# Patient Record
Sex: MALE | Race: WHITE | URBAN - NONMETROPOLITAN AREA
[De-identification: names, ages, dates, MRNs, and addresses within clinical notes are randomized per-mention and may not be internally consistent; named-entity substitution may affect disease eponyms.]

---

## 2017-09-20 ENCOUNTER — OPTICAL OFFICE (OUTPATIENT)
Dept: URBAN - NONMETROPOLITAN AREA CLINIC 5 | Facility: CLINIC | Age: 42
Setting detail: OPHTHALMOLOGY
End: 2017-09-20
Payer: COMMERCIAL

## 2017-09-20 ENCOUNTER — DOCTOR'S OFFICE (OUTPATIENT)
Dept: URBAN - NONMETROPOLITAN AREA CLINIC 2 | Facility: CLINIC | Age: 42
Setting detail: OPHTHALMOLOGY
End: 2017-09-20
Payer: COMMERCIAL

## 2017-09-20 DIAGNOSIS — H52.4: ICD-10-CM

## 2017-09-20 DIAGNOSIS — H52.13: ICD-10-CM

## 2017-09-20 DIAGNOSIS — H52.223: ICD-10-CM

## 2017-09-20 PROCEDURE — 92004 COMPRE OPH EXAM NEW PT 1/>: CPT | Performed by: OPTOMETRIST

## 2017-09-20 PROCEDURE — 92015 DETERMINE REFRACTIVE STATE: CPT | Performed by: OPTOMETRIST

## 2017-09-20 PROCEDURE — V2020 VISION SVCS FRAMES PURCHASES: HCPCS | Performed by: OPTOMETRIST

## 2017-09-20 PROCEDURE — V2202 LENS SPHERE BIFOCAL 7.12-20.: HCPCS | Performed by: OPTOMETRIST

## 2017-09-20 ASSESSMENT — REFRACTION_OUTSIDERX
OD_VA2: 20/20
OD_VA3: 20/
OD_CYLINDER: -0.75
OD_VA1: 20/20
OS_ADD: +1.25
OD_ADD: +1.25
OD_AXIS: 085
OS_SPHERE: -1.75
OU_VA: 20/20
OD_SPHERE: -2.00
OS_VA2: 20/20
OS_VA3: 20/
OS_VA1: 20/20

## 2017-09-20 ASSESSMENT — REFRACTION_MANIFEST
OS_VA2: 20/
OU_VA: 20/
OD_VA2: 20/
OD_VA3: 20/
OD_VA1: 20/
OS_VA2: 20/
OS_VA1: 20/
OS_VA1: 20/
OU_VA: 20/
OS_VA3: 20/
OD_VA1: 20/
OD_VA2: 20/
OS_VA3: 20/
OD_VA3: 20/

## 2017-09-20 ASSESSMENT — REFRACTION_CURRENTRX
OS_SPHERE: -1.75
OS_CYLINDER: -0.25
OS_OVR_VA: 20/
OD_CYLINDER: -0.50
OD_OVR_VA: 20/
OD_VPRISM_DIRECTION: SV
OD_SPHERE: -2.75
OD_OVR_VA: 20/
OD_OVR_VA: 20/
OS_OVR_VA: 20/
OD_AXIS: 88
OS_VPRISM_DIRECTION: SV
OS_OVR_VA: 20/
OS_AXIS: 108

## 2017-09-20 ASSESSMENT — CONFRONTATIONAL VISUAL FIELD TEST (CVF)
OD_FINDINGS: FULL
OS_FINDINGS: FULL

## 2017-09-20 ASSESSMENT — SPHEQUIV_DERIVED
OD_SPHEQUIV: -2.375
OS_SPHEQUIV: -1.75

## 2017-09-20 ASSESSMENT — REFRACTION_AUTOREFRACTION
OS_AXIS: 0
OS_SPHERE: -1.75
OD_SPHERE: -2.00
OD_AXIS: 82
OD_CYLINDER: -0.75
OS_CYLINDER: 0.00

## 2017-09-20 ASSESSMENT — VISUAL ACUITY
OS_BCVA: 20/15-2
OD_BCVA: 20/15-2

## 2017-09-26 ENCOUNTER — OPTICAL OFFICE (OUTPATIENT)
Dept: URBAN - NONMETROPOLITAN AREA CLINIC 5 | Facility: CLINIC | Age: 42
Setting detail: OPHTHALMOLOGY
End: 2017-09-26
Payer: COMMERCIAL

## 2017-09-26 DIAGNOSIS — H52.13: ICD-10-CM

## 2017-09-26 PROCEDURE — V2202 LENS SPHERE BIFOCAL 7.12-20.: HCPCS | Performed by: OPTOMETRIST

## 2017-09-26 PROCEDURE — V2020 VISION SVCS FRAMES PURCHASES: HCPCS | Performed by: OPTOMETRIST

## 2017-09-26 PROCEDURE — V2784 LENS POLYCARB OR EQUAL: HCPCS | Performed by: OPTOMETRIST

## 2025-06-18 ENCOUNTER — HOSPITAL ENCOUNTER (INPATIENT)
Facility: HOSPITAL | Age: 50
LOS: 2 days | Discharge: HOME | DRG: 052 | End: 2025-06-20
Attending: EMERGENCY MEDICINE | Admitting: HOSPITALIST
Payer: COMMERCIAL

## 2025-06-18 ENCOUNTER — APPOINTMENT (EMERGENCY)
Dept: CT IMAGING | Facility: HOSPITAL | Age: 50
DRG: 052 | End: 2025-06-18
Payer: COMMERCIAL

## 2025-06-18 ENCOUNTER — OCCMED (OUTPATIENT)
Dept: URGENT CARE | Facility: MEDICAL CENTER | Age: 50
End: 2025-06-18
Payer: OTHER MISCELLANEOUS
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
## 2025-06-18 DIAGNOSIS — I10 HYPERTENSION: ICD-10-CM

## 2025-06-18 DIAGNOSIS — I63.81 ACUTE LACUNAR INFARCTION (HCC): ICD-10-CM

## 2025-06-18 DIAGNOSIS — I63.9 CEREBRAL INFARCT (HCC): ICD-10-CM

## 2025-06-18 DIAGNOSIS — G45.4 TRANSIENT GLOBAL AMNESIA: ICD-10-CM

## 2025-06-18 DIAGNOSIS — Q25.43 ACTA2-RELATED FAMILIAL THORACIC AORTIC ANEURYSM: ICD-10-CM

## 2025-06-18 DIAGNOSIS — I63.9 CEREBRAL INFARCT (HCC): Primary | ICD-10-CM

## 2025-06-18 DIAGNOSIS — R79.1 SUBTHERAPEUTIC INTERNATIONAL NORMALIZED RATIO (INR): ICD-10-CM

## 2025-06-18 DIAGNOSIS — R29.90 STROKE-LIKE SYMPTOMS: ICD-10-CM

## 2025-06-18 DIAGNOSIS — Z86.73 H/O: CVA (CEREBROVASCULAR ACCIDENT): ICD-10-CM

## 2025-06-18 PROBLEM — Z95.2 S/P AVR (AORTIC VALVE REPLACEMENT): Status: ACTIVE | Noted: 2019-12-07

## 2025-06-18 LAB
ANION GAP SERPL CALCULATED.3IONS-SCNC: 6 MMOL/L (ref 4–13)
BASOPHILS # BLD AUTO: 0.04 THOUSANDS/ÂΜL (ref 0–0.1)
BASOPHILS NFR BLD AUTO: 1 % (ref 0–1)
BUN SERPL-MCNC: 27 MG/DL (ref 5–25)
CALCIUM SERPL-MCNC: 9.3 MG/DL (ref 8.4–10.2)
CHLORIDE SERPL-SCNC: 105 MMOL/L (ref 96–108)
CO2 SERPL-SCNC: 31 MMOL/L (ref 21–32)
CREAT SERPL-MCNC: 1.28 MG/DL (ref 0.6–1.3)
EOSINOPHIL # BLD AUTO: 0.19 THOUSAND/ÂΜL (ref 0–0.61)
EOSINOPHIL NFR BLD AUTO: 3 % (ref 0–6)
ERYTHROCYTE [DISTWIDTH] IN BLOOD BY AUTOMATED COUNT: 13.3 % (ref 11.6–15.1)
GFR SERPL CREATININE-BSD FRML MDRD: 64 ML/MIN/1.73SQ M
GLUCOSE SERPL-MCNC: 108 MG/DL (ref 65–140)
HCT VFR BLD AUTO: 43.8 % (ref 36.5–49.3)
HGB BLD-MCNC: 15.3 G/DL (ref 12–17)
IMM GRANULOCYTES # BLD AUTO: 0.01 THOUSAND/UL (ref 0–0.2)
IMM GRANULOCYTES NFR BLD AUTO: 0 % (ref 0–2)
INR PPP: 1.31 (ref 0.85–1.19)
LYMPHOCYTES # BLD AUTO: 2.57 THOUSANDS/ÂΜL (ref 0.6–4.47)
LYMPHOCYTES NFR BLD AUTO: 35 % (ref 14–44)
MAGNESIUM SERPL-MCNC: 2.4 MG/DL (ref 1.9–2.7)
MCH RBC QN AUTO: 31 PG (ref 26.8–34.3)
MCHC RBC AUTO-ENTMCNC: 34.9 G/DL (ref 31.4–37.4)
MCV RBC AUTO: 89 FL (ref 82–98)
MONOCYTES # BLD AUTO: 0.6 THOUSAND/ÂΜL (ref 0.17–1.22)
MONOCYTES NFR BLD AUTO: 8 % (ref 4–12)
NEUTROPHILS # BLD AUTO: 3.97 THOUSANDS/ÂΜL (ref 1.85–7.62)
NEUTS SEG NFR BLD AUTO: 53 % (ref 43–75)
NRBC BLD AUTO-RTO: 0 /100 WBCS
PLATELET # BLD AUTO: 245 THOUSANDS/UL (ref 149–390)
PMV BLD AUTO: 9.6 FL (ref 8.9–12.7)
POTASSIUM SERPL-SCNC: 4 MMOL/L (ref 3.5–5.3)
PROTHROMBIN TIME: 16.8 SECONDS (ref 12.3–15)
RBC # BLD AUTO: 4.94 MILLION/UL (ref 3.88–5.62)
SODIUM SERPL-SCNC: 142 MMOL/L (ref 135–147)
TSH SERPL DL<=0.05 MIU/L-ACNC: 3.28 UIU/ML (ref 0.45–4.5)
WBC # BLD AUTO: 7.38 THOUSAND/UL (ref 4.31–10.16)

## 2025-06-18 PROCEDURE — 36415 COLL VENOUS BLD VENIPUNCTURE: CPT | Performed by: EMERGENCY MEDICINE

## 2025-06-18 PROCEDURE — 99223 1ST HOSP IP/OBS HIGH 75: CPT | Performed by: FAMILY MEDICINE

## 2025-06-18 PROCEDURE — G0382 LEV 3 HOSP TYPE B ED VISIT: HCPCS | Performed by: PHYSICIAN ASSISTANT

## 2025-06-18 PROCEDURE — 99285 EMERGENCY DEPT VISIT HI MDM: CPT | Performed by: EMERGENCY MEDICINE

## 2025-06-18 PROCEDURE — 99285 EMERGENCY DEPT VISIT HI MDM: CPT

## 2025-06-18 PROCEDURE — 71275 CT ANGIOGRAPHY CHEST: CPT

## 2025-06-18 PROCEDURE — 74174 CTA ABD&PLVS W/CONTRAST: CPT

## 2025-06-18 PROCEDURE — 70498 CT ANGIOGRAPHY NECK: CPT

## 2025-06-18 PROCEDURE — 85610 PROTHROMBIN TIME: CPT | Performed by: EMERGENCY MEDICINE

## 2025-06-18 PROCEDURE — 70496 CT ANGIOGRAPHY HEAD: CPT

## 2025-06-18 PROCEDURE — 99283 EMERGENCY DEPT VISIT LOW MDM: CPT | Performed by: PHYSICIAN ASSISTANT

## 2025-06-18 PROCEDURE — 83735 ASSAY OF MAGNESIUM: CPT | Performed by: EMERGENCY MEDICINE

## 2025-06-18 PROCEDURE — 84443 ASSAY THYROID STIM HORMONE: CPT | Performed by: EMERGENCY MEDICINE

## 2025-06-18 PROCEDURE — 80048 BASIC METABOLIC PNL TOTAL CA: CPT | Performed by: EMERGENCY MEDICINE

## 2025-06-18 PROCEDURE — 85025 COMPLETE CBC W/AUTO DIFF WBC: CPT | Performed by: EMERGENCY MEDICINE

## 2025-06-18 RX ORDER — ACETAMINOPHEN 325 MG/1
650 TABLET ORAL EVERY 6 HOURS PRN
Status: DISCONTINUED | OUTPATIENT
Start: 2025-06-18 | End: 2025-06-20 | Stop reason: HOSPADM

## 2025-06-18 RX ORDER — LISINOPRIL 5 MG/1
2.5 TABLET ORAL EVERY MORNING
Status: DISCONTINUED | OUTPATIENT
Start: 2025-06-19 | End: 2025-06-20 | Stop reason: HOSPADM

## 2025-06-18 RX ORDER — ENOXAPARIN SODIUM 100 MG/ML
1 INJECTION SUBCUTANEOUS EVERY 12 HOURS SCHEDULED
Status: DISCONTINUED | OUTPATIENT
Start: 2025-06-18 | End: 2025-06-20 | Stop reason: HOSPADM

## 2025-06-18 RX ORDER — WARFARIN SODIUM 5 MG/1
5 TABLET ORAL
Status: DISCONTINUED | OUTPATIENT
Start: 2025-06-18 | End: 2025-06-20 | Stop reason: HOSPADM

## 2025-06-18 RX ORDER — ASPIRIN 81 MG/1
81 TABLET, CHEWABLE ORAL DAILY
Status: DISCONTINUED | OUTPATIENT
Start: 2025-06-19 | End: 2025-06-20 | Stop reason: HOSPADM

## 2025-06-18 RX ORDER — METOPROLOL SUCCINATE 100 MG/1
100 TABLET, EXTENDED RELEASE ORAL EVERY MORNING
Status: ON HOLD | COMMUNITY
End: 2025-06-19

## 2025-06-18 RX ORDER — ATORVASTATIN CALCIUM 40 MG/1
40 TABLET, FILM COATED ORAL EVERY EVENING
Status: DISCONTINUED | OUTPATIENT
Start: 2025-06-18 | End: 2025-06-20 | Stop reason: HOSPADM

## 2025-06-18 RX ORDER — NICOTINE 21 MG/24HR
21 PATCH, TRANSDERMAL 24 HOURS TRANSDERMAL EVERY EVENING
Status: DISCONTINUED | OUTPATIENT
Start: 2025-06-18 | End: 2025-06-20 | Stop reason: HOSPADM

## 2025-06-18 RX ORDER — METOPROLOL SUCCINATE 100 MG/1
100 TABLET, EXTENDED RELEASE ORAL EVERY MORNING
Status: DISCONTINUED | OUTPATIENT
Start: 2025-06-19 | End: 2025-06-19

## 2025-06-18 RX ORDER — ASPIRIN 81 MG/1
81 TABLET, CHEWABLE ORAL DAILY
Status: ON HOLD | COMMUNITY
End: 2025-06-19

## 2025-06-18 RX ORDER — LISINOPRIL 2.5 MG/1
2.5 TABLET ORAL EVERY MORNING
Status: ON HOLD | COMMUNITY
End: 2025-06-19

## 2025-06-18 RX ORDER — WARFARIN SODIUM 5 MG/1
5 TABLET ORAL
COMMUNITY

## 2025-06-18 RX ADMIN — IOHEXOL 100 ML: 350 INJECTION, SOLUTION INTRAVENOUS at 16:25

## 2025-06-18 RX ADMIN — NICOTINE 21 MG: 21 PATCH, EXTENDED RELEASE TRANSDERMAL at 19:53

## 2025-06-18 RX ADMIN — IOHEXOL 85 ML: 350 INJECTION, SOLUTION INTRAVENOUS at 16:08

## 2025-06-18 RX ADMIN — ENOXAPARIN SODIUM 80 MG: 80 INJECTION SUBCUTANEOUS at 21:01

## 2025-06-18 NOTE — H&P
H&P - Hospitalist   Name: Isidoro Kathleen 49 y.o. male I MRN: [id number]  Unit/Bed#: 410-01 I Date of Admission: 6/18/2025   Date of Service: 6/18/2025 I Hospital Day: 0     Assessment & Plan  Acute lacunar infarction (HCC)  CAT scan: Age-indeterminate hypodense lacunar infarct in the left caudate and left cerebellum  Lovenox 1 mg/kg every 12 hours  Warfarin 5 mg daily  Current INR 1.31.  Patient states his Coumadin clinic thinks he was INR between 1.5 and 2  Consulted neurology  MRI of the brain and echo  Consulted PT/OT/case management    H/O: CVA (cerebrovascular accident)  Currently on warfarin which is subtherapeutic  Current INR is 1.31  Bridged with Lovenox 1 mg/kg every 12 hours and warfarin 5 mg daily  Repeat INR in the morning  ACTA2-related familial thoracic aortic aneurysm  Follows with cardiology  Status post aortic dissection repair in 2002 with revision in 2006  Continue aspirin 81 mg daily and Toprol  mg daily  S/P AVR (aortic valve replacement)  On-x Valve 2019  Followed by cardiology    Tobacco use disorder  Still smokes three fourths of a pack per day  Placed on nicotine patch      VTE Pharmacologic Prophylaxis: VTE Score: 7 High Risk (Score >/= 5) - Pharmacological DVT Prophylaxis Ordered: enoxaparin (Lovenox). Sequential Compression Devices Ordered.  Code Status: Level 1 - Full Code   Discussion with family: Updated  (daughter) at bedside.    Anticipated Length of Stay: Patient will be admitted on an inpatient basis with an anticipated length of stay of greater than 2 midnights secondary to acute CVA.    History of Present Illness   Chief Complaint: Getting out words and forgetting days    Isidoro Kathleen is a 49 y.o. male with a PMH of history of stroke in the past on warfarin, ACTH 2 related to familial mutation, status post aortic valve replacement, tobacco use disorder who presents with hard time getting out words and forgetting days since 2001.  Patient is accompanied by his  daughter, the patient has had some issues this week twice apparently at work.  The patient was seen in urgent care and referred to the emergency room.  On CAT scan he has had a age-indeterminate lacunar infarct in the left caudate and left cerebellum.  The patient has been taking his medications at home, and is followed by the [facility] cardiology clinic for his Coumadin.    Review of Systems   Constitutional: Negative.    HENT: Negative.     Respiratory: Negative.     Cardiovascular: Negative.    Gastrointestinal: Negative.    Genitourinary: Negative.    Musculoskeletal: Negative.    Neurological:  Positive for speech difficulty.        Memory difficulty       Historical Information   Past Medical History[1]  Past Surgical History[2]  Social History[3]  E-Cigarette/Vaping     E-Cigarette/Vaping Substances     Family history non-contributory  Social History:  Marital Status:    Occupation:   Patient Pre-hospital Living Situation: Home  Patient Pre-hospital Level of Mobility: walks  Patient Pre-hospital Diet Restrictions: None cardiac    Meds/Allergies   I have reviewed home medications using recent Epic encounter.  Prior to Admission medications    Medication Sig Start Date End Date Taking? Authorizing Provider   aspirin 81 mg chewable tablet Chew 81 mg daily    Historical Provider, MD   lisinopril (ZESTRIL) 2.5 mg tablet Take 2.5 mg by mouth every morning    Historical Provider, MD   metoprolol succinate (TOPROL-XL) 100 mg 24 hr tablet Take 100 mg by mouth every morning    Historical Provider, MD   warfarin (COUMADIN) 5 mg tablet Take 5 mg by mouth daily CURRENT DOSE IS 2.5 MG FRIDAYS AND 5 MG ALL OTHER DAYS OF THE WEEK    Historical Provider, MD     No Known Allergies    Objective :  Temp:  [97.3 °F (36.3 °C)-97.7 °F (36.5 °C)] 97.3 °F (36.3 °C)  HR:  [56-64] 59  BP: (114-133)/(61-74) 121/74  Resp:  [16-18] 16  SpO2:  [93 %-96 %] 93 %  O2 Device: None (Room air)    Physical Exam  Constitutional:        Appearance: Normal appearance. He is normal weight.   HENT:      Head: Normocephalic and atraumatic.      Nose: Nose normal.      Mouth/Throat:      Mouth: Mucous membranes are moist.      Pharynx: Oropharynx is clear.     Eyes:      Extraocular Movements: Extraocular movements intact.      Conjunctiva/sclera: Conjunctivae normal.       Cardiovascular:      Rate and Rhythm: Normal rate and regular rhythm.      Pulses: Normal pulses.      Heart sounds: Normal heart sounds.   Pulmonary:      Effort: Pulmonary effort is normal.      Breath sounds: Normal breath sounds.   Abdominal:      Palpations: Abdomen is soft.      Tenderness: There is no abdominal tenderness. There is no guarding.     Musculoskeletal:         General: No swelling.      Cervical back: Normal range of motion.     Skin:     General: Skin is warm and dry.     Neurological:      General: No focal deficit present.      Mental Status: He is alert and oriented to person, place, and time. Mental status is at baseline.      Comments: Patient states that he just cannot remember some stuff from this week, his word finding is getting better   Psychiatric:         Mood and Affect: Mood normal.         Behavior: Behavior normal.              Lab Results: I have reviewed the following results:  Results from last 7 days   Lab Units 06/18/25  1507   WBC Thousand/uL 7.38   HEMOGLOBIN g/dL 15.3   HEMATOCRIT % 43.8   PLATELETS Thousands/uL 245   SEGS PCT % 53   LYMPHO PCT % 35   MONO PCT % 8   EOS PCT % 3     Results from last 7 days   Lab Units 06/18/25  1507   SODIUM mmol/L 142   POTASSIUM mmol/L 4.0   CHLORIDE mmol/L 105   CO2 mmol/L 31   BUN mg/dL 27*   CREATININE mg/dL 1.28   ANION GAP mmol/L 6   CALCIUM mg/dL 9.3   GLUCOSE RANDOM mg/dL 108     Results from last 7 days   Lab Units 06/18/25  1507   INR  1.31*         Lab Results   Component Value Date    HGBA1C 5.5 08/29/2022    HGBA1C 5.5 11/27/2019           Imaging Results Review: I reviewed radiology reports  from this admission including: CT abdomen/pelvis and CT head.  Other Study Results Review: No additional pertinent studies reviewed.    Administrative Statements     Medical decision making: High  Diagnosis addressed: Illness/injury posing threat to life or bodily function with acute CVA  Data:   Reviewed  CBC, CMP, CTA head and neck, CT dissection protocol, magnesium, INR  Ordered CBC, BMP, INR  Reviewed external notes from cardiology and PCP  Discussion of management with ER provider: Will need to bridge for his warfarin           Risk:  Prescription drug management: Warfarin and Lovenox  Discussion for hospitalization with ER provider: Acute stroke  Drug therapy requiring intensive monitoring: Warfarin requiring daily INR        ** Please Note: This note has been constructed using a voice recognition system. **         [1]   Past Medical History:  Diagnosis Date    Hypertension     Stroke (HCC)    [2]   Past Surgical History:  Procedure Laterality Date    AORTIC VALVE REPLACEMENT N/A    [3]   Social History  Tobacco Use    Smoking status: Former     Types: Cigarettes    Smokeless tobacco: Never   Substance and Sexual Activity    Alcohol use: Not Currently    Drug use: Never

## 2025-06-18 NOTE — ASSESSMENT & PLAN NOTE
Currently on warfarin which is subtherapeutic  Current INR is 1.31  Bridged with Lovenox 1 mg/kg every 12 hours and warfarin 5 mg daily  Repeat INR in the morning

## 2025-06-18 NOTE — ASSESSMENT & PLAN NOTE
Follows with cardiology  Status post aortic dissection repair in 2002 with revision in 2006  Continue aspirin 81 mg daily and Toprol  mg daily

## 2025-06-18 NOTE — PROGRESS NOTES
This encounter was created for [facility] orders only .     Here for workman's comp initial, with concerns regarding:  Loss of memory, imbalance, disoriented x 7 hours on 6/16. Now mostly recovered, but with inability to accurately state the year.   PMH/PSH of aortic dissection in 2001 with  valve placed in 2001.   PMH of seizures.   Neuro exam, strength, speech WNL.   ADT 21 order placed. Patient's daughter to drive him to  [facility] ER for further evaluation.    No Available Appropriate Bed at this Facility

## 2025-06-18 NOTE — Clinical Note
Case was discussed with ANTWAN and the patient's admission status was agreed to be Admission Status: observation status to the service of Dr. Stuaffer .

## 2025-06-18 NOTE — ED PROVIDER NOTES
Time reflects when diagnosis was documented in both MDM as applicable and the Disposition within this note       Time User Action Codes Description Comment    6/18/2025  5:42 PM Ritz, Marcelo Sourav Add [I63.9] Cerebral infarct (HCC)     6/18/2025  5:42 PM Ritz, Marcelo Sourav Modify [I63.9] Cerebral infarct of left caudate, age-indeterminate     6/18/2025  5:42 PM Ritz, Marcelo Sourav Add [I63.9] Cerebral infarct (HCC)     6/18/2025  5:42 PM Ritz, Marcelo Sourav Modify [I63.9] Cerebral infarct of left cerebellum, age-indeterminate     6/18/2025  5:42 PM Ritz, Marcelo Sourav Add [R79.1] Subtherapeutic international normalized ratio (INR)     6/18/2025  5:42 PM Ritz, Marcelo Sourav Add [G45.4] Transient global amnesia     6/18/2025  7:12 PM Mike Silva Add [I63.81] Acute lacunar infarction (HCC)           ED Disposition       ED Disposition   Admit    Condition   Stable    Date/Time   Wed Jun 18, 2025  6:20 PM    Comment   Case was discussed with ANTWAN and the patient's admission status was agreed to be Admission Status: inpatient status to the service of Dr. Stauffer .               Assessment & Plan       Medical Decision Making  He is reporting episodes of what sound like transient global amnesia with these episodes previously being associated with the aftereffects of an ischemic CVA.  He does not have any focal neurologic deficits now and has an NIH stroke score of 0.  I will obtain CTA head/neck for intracranial or acute vascular pathology.  If this is normal, he will certainly require further evaluation with neurology but specifically to determine the need for an antiepileptic drug.  Check CBC/BMP/magnesium/TSH/INR.    Amount and/or Complexity of Data Reviewed  Labs: ordered. Decision-making details documented in ED Course.  Radiology: ordered. Decision-making details documented in ED Course.    Risk  Prescription drug management.  Decision regarding hospitalization.        ED Course as of 06/19/25 1451   Wed  Jun 18, 2025   1528 CBC and differential   1537 Protime-INR(!)  Subtherapeutic   1538 Basic metabolic panel(!)   1538 Magnesium   1539 No substantial electrolyte or renal function abnormalities.  No priors for comparison.   1614 TSH  Normal   1614 Called to CT room by technician.  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 I see contrast both in the true and false lumens of the dissected region; will obtain CTA dissection study to better characterize the dissection itself.  Patient at present in no distress whatsoever with no chest pain or other acute symptoms including any acute neurologic symptoms. No chest pain. No abd pain. No thoracic back pain.   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 Per chart review, Mr. Kathleen previously followed with Dr. Santos. Evaluated on 11/20/2020, at which time, patient was reportedly doing well, with good blood pressure control. Updated Echo showing normal LVEF and normal functioning On-X valve. ASA, Coumadin, and Toprol XL were continued. Reinforced the importance of prophylactic antibiotics prior to invasive procedures/dental work, and counseled that both of his children who testing positive will need screening Echo's.     ACATA2 gene Aortopathy  S/P Aortic dissection repair.  Severe AI s/p AVR       1641 Additional records now available in epic including radiology reports from multiple prior study. I reviewed a number of CTA studies ranging back  through 2013 showing consistent aortic dissection from the mid aortic arch extending into the inferior mesenteric artery.  In multiple cases, contrast opacified both the true and false lumens.  As such, much lower suspicion that this is acute in any way.  Will await formal radiology interpretation.    CTA head/neck is being reviewed now by radiology   1703 Prior cardiothoracic surgery note 2010:  35 yo man who is 8 yrs post asc aortic graft repair of Type 1 dissection (by Dr Brown) and 4 yrs post escision (by Dr Barlow) of redundant graft to correct a kink (suffered a stroke before that which has left no residual).His family hx for such diease is remarkable.     He is followed well by Dr Cagel. He is asx, takes his Atenolol, and had MR l6 mos ago and CT 1 yr ago showing no changes.    He has flow in both lumens - no increasing in aortic size, and visceral vessels from both channels. Dissection ends between renals and bifurcation.  He has some AI. Appears to be normally developed aortic valve, and   root is 4.2 cm and stable. The last op note suggests he may have some protection at root in the form of dense scarring.     BP excellent.    He has f/u arranged with Dr Cagle in 6 mos. No new studies today. Will see again PRN but am encouraged by his stability and do not need to duplicate his visits.    Darwin Iverson MD  3/17/2010  9:50 AM      1707 CTA head and neck with and without contrast  IMPRESSION:     CT Brain:  - Age-indeterminate hypodense lacunar infarcts in left caudate and left cerebellum. Recommend MRI brain without contrast for further evaluation.  - Mild chronic microangiopathy.     CT Angiography:  - Negative CTA head and neck for large vessel occlusion, dissection, aneurysm, or high-grade stenosis.     Partially imaged postsurgical repair of aortic dissection - please see same day CTA dissection protocol for further evaluation..     Additional chronic/incidental findings as detailed above.         1708 Age-indeterminate left caudate/left cerebellar hypodense infarcts are noted. Only prior CT head available for review was from September 2024 where no such infarcts were noted.  More detail regarding the dissection will be included in the CTA chest portion.  There is a recommendation for MRI of the brain for further characterization of the infarcts.   1738 Message to Dr. Kessler of neurology regarding utility of inpatient evaluation versus outpatient for TGA/cerebral infarct findings   1740 Dr Kessler advises admission with heparin/Lovenox bridging given subtherapeutic INR and further stroke workup  Will plan to admit unless CTA dissection study shows any acute elements   1748 Signed over to Dr. Murphy pending CTA dissection study read and anticipated admission       Medications   enoxaparin (LOVENOX) subcutaneous injection 80 mg (has no administration in time range)   warfarin (COUMADIN) tablet 5 mg (has no administration in time range)   iohexol (OMNIPAQUE) 350 MG/ML injection (MULTI-DOSE) 85 mL (85 mL Intravenous Given 6/18/25 1608)   iohexol (OMNIPAQUE) 350 MG/ML injection (MULTI-DOSE) 100 mL (100 mL Intravenous Given 6/18/25 1625)       ED Risk Strat Scores         Stroke Assessment       Row Name 06/18/25 1521             NIH Stroke Scale    Interval Baseline      Level of Consciousness (1a.) 0      LOC Questions (1b.) 0      LOC Commands (1c.) 0      Best Gaze (2.) 0      Visual (3.) 0      Facial Palsy (4.) 0      Motor Arm, Left (5a.) 0      Motor Arm, Right (5b.) 0      Motor Leg, Left (6a.) 0      Motor Leg, Right (6b.) 0      Limb Ataxia (7.) 0      Sensory (8.) 0      Best Language (9.) 0      Dysarthria (10.) 0      Extinction and Inattention (11.) (Formerly Neglect) 0      Total 0                              No data recorded        SBIRT 20yo+      Flowsheet Row Most Recent Value   Initial Alcohol Screen: US AUDIT-C     1. How often do you have a drink containing alcohol? 0 Filed at:  06/18/2025 1445   2. How many drinks containing alcohol do you have on a typical day you are drinking?  0 Filed at: 06/18/2025 1445   3a. Male UNDER 65: How often do you have five or more drinks on one occasion? 0 Filed at: 06/18/2025 1445   Audit-C Score 0 Filed at: 06/18/2025 1445   JENNIFER: How many times in the past year have you...    Used an illegal drug or used a prescription medication for non-medical reasons? Never Filed at: 06/18/2025 1445                            History of Present Illness       Chief Complaint   Patient presents with    Altered Mental Status     Patient reports that he has had a hard time getting out words and forgetting days since 2001       Past Medical History[1]   Past Surgical History[2]   Family History[3]   Social History[4]   E-Cigarette/Vaping      E-Cigarette/Vaping Substances      I have reviewed and agree with the history as documented.     49M with noted past medical history presents to emergency department for evaluation of episode occurring 2 days prior while at work.  States that he had a 7-hour period for which he has total nausea, not being aware of what happened during that timeframe.  He did speak with his supervisor who confirmed that he was working during that timeframe and indeed had a typical output for his workplace (works as a  in an [employer]). Had prior episode 2 weeks before this lasting about 4 hours, again with similar nature with amnesia for the events.  He has had similar episodes since about 2001 during which time he had emergent repair of an aortic dissection with resultant ischemic CVA after which he began to have these episodes.  During the episodes, he loses track of what is happening and does not have any recall of what happens.  These episodes were eventually identified as seizures, and he had previously taken levetiracetam for same.  He weaned himself off the medication about 15 years ago due to cost.  He did have some  episodes thereafter, and he was briefly hospitalized 7 years ago to attempt to provoke these episodes to determine if he would require additional antiepileptic drugs.  No such episodes occurred while he was hospitalized, and he was not placed back on any antiepileptic drugs.  Immediately following the episodes, he does feel nauseated and has return of awareness as to what he is doing. Between the episodes he otherwise feels well with no residual or ongoing symptoms.  Apart from the episode of TGA, he has had no episodes of dysarthria/aphasia/vision loss/extremity weakness or paresthesias/acute gait disturbance.  Underwent mechanical aortic valve replacement in 2019; he does take warfarin due to the mechanical valve and has it checked every 6 weeks consistently.  He was seen in urgent care today and directed to the emergency department for further evaluation  He was report that he was unable to state the year.  He states that he had some difficulty recalling what year was this morning. He was able to determine that the year is 2025 as his son graduated from school this spring, having started in 2024.        History provided by:  Patient and medical records  Altered Mental Status  Presenting symptoms: confusion    Associated symptoms: no abdominal pain, no fever, no headaches, no light-headedness, no nausea, no palpitations, no vomiting and no weakness        Review of Systems   Constitutional:  Negative for fatigue and fever.   Eyes:  Negative for photophobia and visual disturbance.   Respiratory:  Negative for chest tightness and shortness of breath.    Cardiovascular:  Negative for chest pain and palpitations.   Gastrointestinal:  Negative for abdominal pain, nausea and vomiting.   Musculoskeletal: Negative.    Skin: Negative.    Neurological:  Negative for dizziness, tremors, syncope, facial asymmetry, speech difficulty, weakness, light-headedness, numbness and headaches.   Psychiatric/Behavioral:  Positive for  confusion and decreased concentration. Negative for self-injury.            Objective       ED Triage Vitals [06/18/25 1442]   Temperature Pulse Blood Pressure Respirations SpO2 Patient Position - Orthostatic VS   97.7 °F (36.5 °C) 64 132/74 18 93 % Sitting      Temp Source Heart Rate Source BP Location FiO2 (%) Pain Score    Temporal Monitor Right arm -- No Pain      Vitals      Date and Time Temp Pulse SpO2 Resp BP Pain Score FACES Pain Rating User   06/19/25 1156 98.6 °F (37 °C) -- 98 % -- -- No Pain -- LR   06/19/25 1115 -- 54 -- -- -- -- -- EP   06/19/25 1115 -- -- -- -- 128/70 -- -- DII   06/19/25 1044 -- -- -- -- -- No Pain -- EW   06/19/25 0833 -- 55 95 % -- 143/87 -- -- DII   06/19/25 0826 -- 54 97 % -- 148/85 -- -- DII   06/19/25 0826 -- -- -- -- 148/85 -- -- LR   06/19/25 0729 99.3 °F (37.4 °C) -- -- -- 118/73 -- -- DII   06/19/25 0310 97.1 °F (36.2 °C) -- -- -- -- -- -- AR   06/19/25 0310 -- 55 97 % -- 120/71 -- -- DII   06/18/25 2306 98.3 °F (36.8 °C) 51 97 % -- 120/71 -- -- DII   06/18/25 1929 -- -- -- -- -- No Pain -- AR   06/18/25 1922 -- -- -- -- -- No Pain -- AR   06/18/25 1910 97.3 °F (36.3 °C) -- -- 16 -- -- -- MD   06/18/25 1910 -- 59 93 % -- 121/74 -- -- DII   06/18/25 1633 -- -- 94 % -- 133/61 -- -- LD   06/18/25 1530 -- 56 95 % -- 114/64 -- -- LD   06/18/25 1500 -- 59 95 % -- 127/63 -- -- LD   06/18/25 1443 -- 63 96 % -- 132/72 -- -- LD   06/18/25 1442 97.7 °F (36.5 °C) 64 93 % 18 132/74 No Pain -- PP            Physical Exam  Vitals and nursing note reviewed.   Constitutional:       General: He is awake. He is not in acute distress.     Appearance: Normal appearance. He is well-developed.   HENT:      Head: Normocephalic and atraumatic.      Right Ear: Hearing and external ear normal.      Left Ear: Hearing and external ear normal.     Eyes:      General: Lids are normal. Vision grossly intact.      Extraocular Movements: Extraocular movements intact.      Conjunctiva/sclera: Conjunctivae  normal.      Pupils: Pupils are equal, round, and reactive to light.      Visual Fields: Right eye visual fields normal and left eye visual fields normal.     Neck:      Trachea: Trachea and phonation normal.     Cardiovascular:      Rate and Rhythm: Normal rate and regular rhythm.      Pulses:           Radial pulses are 2+ on the right side and 2+ on the left side.        Dorsalis pedis pulses are 2+ on the right side and 2+ on the left side.        Posterior tibial pulses are 2+ on the right side and 2+ on the left side.      Heart sounds: Normal heart sounds, S1 normal and S2 normal. No murmur heard.     No friction rub. No gallop.      Comments: Very loud S2 c/w mechanical valve  Pulmonary:      Effort: Pulmonary effort is normal. No respiratory distress.      Breath sounds: Normal breath sounds. No stridor. No decreased breath sounds, wheezing, rhonchi or rales.   Abdominal:      General: There is no distension.      Palpations: There is no mass.      Tenderness: There is no abdominal tenderness. There is no guarding or rebound.     Skin:     General: Skin is warm and dry.     Neurological:      Mental Status: He is alert and oriented to person, place, and time.      GCS: GCS eye subscore is 4. GCS verbal subscore is 5. GCS motor subscore is 6.      Cranial Nerves: No cranial nerve deficit, dysarthria or facial asymmetry.      Sensory: Sensation is intact. No sensory deficit.      Motor: Motor function is intact. No weakness, abnormal muscle tone or pronator drift.      Coordination: Coordination is intact.      Comments: PERRLA; EOMI. Sensation intact to light touch over face in V1-V3 distribution bilaterally. Facial expressions symmetric. Tongue/uvula midline. Shoulder shrug equal bilaterally. Strength 5/5 in UE/LE bilaterally. Sensation intact to light touch in UE/LE bilaterally.  There is no dysarthria or aphasia       Results Reviewed       Procedure Component Value Units Date/Time    TSH [[id number]]   (Normal) Collected: 06/18/25 1507    Lab Status: Final result Specimen: Blood from Arm, Right Updated: 06/18/25 1553     TSH 3RD GENERATION 3.283 uIU/mL     Basic metabolic panel [[id number]]  (Abnormal) Collected: 06/18/25 1507    Lab Status: Final result Specimen: Blood from Arm, Right Updated: 06/18/25 1537     Sodium 142 mmol/L      Potassium 4.0 mmol/L      Chloride 105 mmol/L      CO2 31 mmol/L      ANION GAP 6 mmol/L      BUN 27 mg/dL      Creatinine 1.28 mg/dL      Glucose 108 mg/dL      Calcium 9.3 mg/dL      eGFR 64 ml/min/1.73sq m     Narrative:      National Kidney Disease Foundation guidelines for Chronic Kidney Disease (CKD):     Stage 1 with normal or high GFR (GFR > 90 mL/min/1.73 square meters)    Stage 2 Mild CKD (GFR = 60-89 mL/min/1.73 square meters)    Stage 3A Moderate CKD (GFR = 45-59 mL/min/1.73 square meters)    Stage 3B Moderate CKD (GFR = 30-44 mL/min/1.73 square meters)    Stage 4 Severe CKD (GFR = 15-29 mL/min/1.73 square meters)    Stage 5 End Stage CKD (GFR <15 mL/min/1.73 square meters)  Note: GFR calculation is accurate only with a steady state creatinine    Magnesium [[id number]]  (Normal) Collected: 06/18/25 1507    Lab Status: Final result Specimen: Blood from Arm, Right Updated: 06/18/25 1537     Magnesium 2.4 mg/dL     Protime-INR [[id number]]  (Abnormal) Collected: 06/18/25 1507    Lab Status: Final result Specimen: Blood from Arm, Right Updated: 06/18/25 1536     Protime 16.8 seconds      INR 1.31    Narrative:      INR Therapeutic Range    Indication                                             INR Range      Atrial Fibrillation                                               2.0-3.0  Hypercoagulable State                                    2.0.2.3  Left Ventricular Asist Device                            2.0-3.0  Mechanical Heart Valve                                  -    Aortic(with afib, MI, embolism, HF, LA enlargement,    and/or coagulopathy)                                      2.0-3.0 (2.5-3.5)     Mitral                                                             2.5-3.5  Prosthetic/Bioprosthetic Heart Valve               2.0-3.0  Venous thromboembolism (VTE: VT, PE        2.0-3.0    CBC and differential [[id number]] Collected: 06/18/25 1507    Lab Status: Final result Specimen: Blood from Arm, Right Updated: 06/18/25 1521     WBC 7.38 Thousand/uL      RBC 4.94 Million/uL      Hemoglobin 15.3 g/dL      Hematocrit 43.8 %      MCV 89 fL      MCH 31.0 pg      MCHC 34.9 g/dL      RDW 13.3 %      MPV 9.6 fL      Platelets 245 Thousands/uL      nRBC 0 /100 WBCs      Segmented % 53 %      Immature Grans % 0 %      Lymphocytes % 35 %      Monocytes % 8 %      Eosinophils Relative 3 %      Basophils Relative 1 %      Absolute Neutrophils 3.97 Thousands/µL      Absolute Immature Grans 0.01 Thousand/uL      Absolute Lymphocytes 2.57 Thousands/µL      Absolute Monocytes 0.60 Thousand/µL      Eosinophils Absolute 0.19 Thousand/µL      Basophils Absolute 0.04 Thousands/µL             MRI brain wo contrast   Final Interpretation by Mart Garsia MD (06/19 1031)      1.  No acute infarction.   2.  Chronic microangiopathic changes and chronic infarctions as above      Workstation performed: [id number]         CTA dissection protocol chest/abdomen/pelvis   Final Interpretation by Corwin Kaur MD (06/18 9636)      1. Prosthetic aortic valve and ascending in the graft repair. Type B dissection with patent false lumen and mild aneurysmal dilatation of the descending thoracic aorta.   2. No evidence of acute process in the chest, abdomen or pelvis.            Computerized Assisted Algorithm (CAA) may have aided analysis of applicable images.         Workstation performed: [id number]         CTA head and neck with and without contrast   Final Interpretation by Rayo Carrion MD (06/18 8485)      CT Brain:   - Age-indeterminate hypodense lacunar infarcts in left caudate and left  cerebellum. Recommend MRI brain without contrast for further evaluation.   - Mild chronic microangiopathy.      CT Angiography:   - Negative CTA head and neck for large vessel occlusion, dissection, aneurysm, or high-grade stenosis.      Partially imaged postsurgical repair of aortic dissection - please see same day CTA dissection protocol for further evaluation..      Additional chronic/incidental findings as detailed above.      The study was marked in EPIC for immediate notification.                  Workstation performed: [id number]             Procedures    ED Medication and Procedure Management   Prior to Admission Medications   Prescriptions Last Dose Informant Patient Reported? Taking?   aspirin 81 mg chewable tablet 6/18/2025 at  9:00 AM  Yes Yes   Sig: Chew 81 mg daily   lisinopril (ZESTRIL) 2.5 mg tablet 6/18/2025  Yes Yes   Sig: Take 2.5 mg by mouth every morning   metoprolol succinate (TOPROL-XL) 100 mg 24 hr tablet 6/18/2025  Yes Yes   Sig: Take 100 mg by mouth every morning   warfarin (COUMADIN) 5 mg tablet 6/18/2025  Yes Yes   Sig: Take 5 mg by mouth daily CURRENT DOSE IS 2.5 MG MONDAYS AND FRIDAYS AND 5 MG ALL OTHER DAYS OF THE WEEK      Facility-Administered Medications: None     Current Discharge Medication List        CONTINUE these medications which have NOT CHANGED    Details   aspirin 81 mg chewable tablet Chew 81 mg daily      lisinopril (ZESTRIL) 2.5 mg tablet Take 2.5 mg by mouth every morning      metoprolol succinate (TOPROL-XL) 100 mg 24 hr tablet Take 100 mg by mouth every morning      warfarin (COUMADIN) 5 mg tablet Take 5 mg by mouth daily CURRENT DOSE IS 2.5 MG MONDAYS AND FRIDAYS AND 5 MG ALL OTHER DAYS OF THE WEEK           No discharge procedures on file.  ED SEPSIS DOCUMENTATION   Time reflects when diagnosis was documented in both MDM as applicable and the Disposition within this note       Time User Action Codes Description Comment    6/18/2025  5:42 PM Marcelo Patel Add  [I63.9] Cerebral infarct (HCC)     6/18/2025  5:42 PM Ritz, Marcelo Sourav Modify [I63.9] Cerebral infarct of left caudate, age-indeterminate     6/18/2025  5:42 PM Ritz, Marcelo Sourav Add [I63.9] Cerebral infarct (HCC)     6/18/2025  5:42 PM Ritz, Marcelo Sourav Modify [I63.9] Cerebral infarct of left cerebellum, age-indeterminate     6/18/2025  5:42 PM Ritz, Marcelo Sourav Add [R79.1] Subtherapeutic international normalized ratio (INR)     6/18/2025  5:42 PM Ritz, Marcelo Sourav Add [G45.4] Transient global amnesia     6/18/2025  7:12 PM Mike Silva Add [I63.81] Acute lacunar infarction (HCC)                      [1]   Past Medical History:  Diagnosis Date    Hypertension     Stroke (HCC)    [2]   Past Surgical History:  Procedure Laterality Date    AORTIC VALVE REPLACEMENT N/A    [3] No family history on file.  [4]   Social History  Tobacco Use    Smoking status: Former     Types: Cigarettes    Smokeless tobacco: Never   Substance Use Topics    Alcohol use: Not Currently    Drug use: Never        Marcelo Patel DO  06/19/25 1452

## 2025-06-18 NOTE — LETTER
[facility] MEDICAL SURGICAL UNIT  [address] [address]  Dept: [phone]    June 20, 2025     Patient: Isidoro Kathleen   YOB: 1975   Date of Visit: 6/18/2025       To Whom it May Concern:    He was seen in the hospital from 6/18/2025 to 06/20/25. He may return to work on 6/23/25, with the recommendation of 8 hour work days until he follows up with primary care provider whom he needs to follow-up within 1-2 weeks .    If you have any questions or concerns, please don't hesitate to call.         Sincerely,          Deneen Simons MD

## 2025-06-18 NOTE — ASSESSMENT & PLAN NOTE
CAT scan: Age-indeterminate hypodense lacunar infarct in the left caudate and left cerebellum  Lovenox 1 mg/kg every 12 hours  Warfarin 5 mg daily  Current INR 1.31.  Patient states his Coumadin clinic thinks he was INR between 1.5 and 2  Consulted neurology  MRI of the brain and echo  Consulted PT/OT/case management

## 2025-06-18 NOTE — LETTER
[facility] MEDICAL SURGICAL UNIT  [address] [address]  Dept: [phone]    June 19, 2025     Patient: Isidoro Kathleen   YOB: 1975   Date of Visit: 6/18/2025       To Whom it May Concern:    Isidoro Kathleen is under my professional care. He was seen in the hospital from 6/18/2025 to 06/19/25. He may return to work on 6/23/25, with the recommendation of 8 hour work days until he follows up with primary care provider whom he needs to follow-up within 1-2 weeks .    If you have any questions or concerns, please don't hesitate to call.         Sincerely,          Deneen Simons MD

## 2025-06-19 ENCOUNTER — APPOINTMENT (INPATIENT)
Dept: MRI IMAGING | Facility: HOSPITAL | Age: 50
DRG: 052 | End: 2025-06-19
Payer: COMMERCIAL

## 2025-06-19 ENCOUNTER — APPOINTMENT (INPATIENT)
Dept: NON INVASIVE DIAGNOSTICS | Facility: HOSPITAL | Age: 50
DRG: 052 | End: 2025-06-19
Payer: COMMERCIAL

## 2025-06-19 LAB
ANION GAP SERPL CALCULATED.3IONS-SCNC: 5 MMOL/L (ref 4–13)
AORTIC ROOT: 2.7 CM
AORTIC VALVE MEAN VELOCITY: 10.8 M/S
ASCENDING AORTA: 3.1 CM
AV AREA BY CONTINUOUS VTI: 1.9 CM2
AV AREA PEAK VELOCITY: 1.9 CM2
AV LVOT MEAN GRADIENT: 3 MMHG
AV LVOT PEAK GRADIENT: 6 MMHG
AV MEAN PRESS GRAD SYS DOP V1V2: 6 MMHG
AV ORIFICE AREA US: 1.94 CM2
AV PEAK GRADIENT: 11 MMHG
AV VELOCITY RATIO: 0.68
AV VMAX SYS DOP: 1.65 M/S
BSA FOR ECHO PROCEDURE: 1.98 M2
BUN SERPL-MCNC: 24 MG/DL (ref 5–25)
CALCIUM SERPL-MCNC: 8.6 MG/DL (ref 8.4–10.2)
CHLORIDE SERPL-SCNC: 109 MMOL/L (ref 96–108)
CHOLEST SERPL-MCNC: 138 MG/DL (ref ?–200)
CO2 SERPL-SCNC: 25 MMOL/L (ref 21–32)
CREAT SERPL-MCNC: 0.99 MG/DL (ref 0.6–1.3)
DOP CALC AO VTI: 37.5 CM
DOP CALC LVOT AREA: 2.83 CM2
DOP CALC LVOT CARDIAC INDEX: 2.08 L/MIN/M2
DOP CALC LVOT CARDIAC OUTPUT: 4.12 L/MIN
DOP CALC LVOT DIAMETER: 1.9 CM
DOP CALC LVOT PEAK VEL VTI: 25.66 CM
DOP CALC LVOT PEAK VEL: 1.12 M/S
DOP CALC LVOT STROKE INDEX: 37.4 ML/M2
DOP CALC LVOT STROKE VOLUME: 74
E WAVE DECELERATION TIME: 237 MS
E/A RATIO: 1.68
ERYTHROCYTE [DISTWIDTH] IN BLOOD BY AUTOMATED COUNT: 13.3 % (ref 11.6–15.1)
EST. AVERAGE GLUCOSE BLD GHB EST-MCNC: 111 MG/DL
FRACTIONAL SHORTENING: 34 (ref 28–44)
GFR SERPL CREATININE-BSD FRML MDRD: 87 ML/MIN/1.73SQ M
GLUCOSE SERPL-MCNC: 82 MG/DL (ref 65–140)
HBA1C MFR BLD: 5.5 %
HCT VFR BLD AUTO: 42.1 % (ref 36.5–49.3)
HDLC SERPL-MCNC: 31 MG/DL
HGB BLD-MCNC: 14.6 G/DL (ref 12–17)
INR PPP: 1.4 (ref 0.85–1.19)
INTERVENTRICULAR SEPTUM IN DIASTOLE (PARASTERNAL SHORT AXIS VIEW): 1.5 CM
INTERVENTRICULAR SEPTUM: 1.5 CM (ref 0.6–1.1)
LAAS-AP2: 22.4 CM2
LAAS-AP4: 22.9 CM2
LDLC SERPL CALC-MCNC: 72 MG/DL (ref 0–100)
LEFT ATRIUM SIZE: 4.9 CM
LEFT ATRIUM VOLUME (MOD BIPLANE): 73 ML
LEFT ATRIUM VOLUME INDEX (MOD BIPLANE): 36.9 ML/M2
LEFT INTERNAL DIMENSION IN SYSTOLE: 2.9 CM (ref 2.1–4)
LEFT VENTRICULAR INTERNAL DIMENSION IN DIASTOLE: 4.4 CM (ref 3.5–6)
LEFT VENTRICULAR POSTERIOR WALL IN END DIASTOLE: 1.4 CM
LEFT VENTRICULAR STROKE VOLUME: 58 ML
LV EF US.2D.A4C+ESTIMATED: 66 %
LVSV (TEICH): 58 ML
MCH RBC QN AUTO: 30.5 PG (ref 26.8–34.3)
MCHC RBC AUTO-ENTMCNC: 34.7 G/DL (ref 31.4–37.4)
MCV RBC AUTO: 88 FL (ref 82–98)
MV E'TISSUE VEL-LAT: 11 CM/S
MV E'TISSUE VEL-SEP: 6 CM/S
MV PEAK A VEL: 0.62 M/S
MV PEAK E VEL: 104 CM/S
MV STENOSIS PRESSURE HALF TIME: 69 MS
MV VALVE AREA P 1/2 METHOD: 3.2
PA SYSTOLIC PRESSURE: 28 MMHG
PLATELET # BLD AUTO: 206 THOUSANDS/UL (ref 149–390)
PMV BLD AUTO: 9.5 FL (ref 8.9–12.7)
POTASSIUM SERPL-SCNC: 4 MMOL/L (ref 3.5–5.3)
PROTHROMBIN TIME: 17.6 SECONDS (ref 12.3–15)
RBC # BLD AUTO: 4.78 MILLION/UL (ref 3.88–5.62)
RIGHT ATRIUM AREA SYSTOLE A4C: 26.3 CM2
RIGHT VENTRICLE ID DIMENSION: 3.2 CM
SL CV LEFT ATRIUM LENGTH A2C: 5.7 CM
SL CV LV EF: 60
SL CV PED ECHO LEFT VENTRICLE DIASTOLIC VOLUME (MOD BIPLANE) 2D: 89 ML
SL CV PED ECHO LEFT VENTRICLE SYSTOLIC VOLUME (MOD BIPLANE) 2D: 31 ML
SODIUM SERPL-SCNC: 139 MMOL/L (ref 135–147)
TR MAX PG: 20 MMHG
TR PEAK VELOCITY: 2.3 M/S
TRICUSPID ANNULAR PLANE SYSTOLIC EXCURSION: 2.1 CM
TRICUSPID VALVE PEAK REGURGITATION VELOCITY: 2.25 M/S
TRIGL SERPL-MCNC: 177 MG/DL (ref ?–150)
WBC # BLD AUTO: 5.84 THOUSAND/UL (ref 4.31–10.16)

## 2025-06-19 PROCEDURE — 80048 BASIC METABOLIC PNL TOTAL CA: CPT | Performed by: FAMILY MEDICINE

## 2025-06-19 PROCEDURE — 97165 OT EVAL LOW COMPLEX 30 MIN: CPT

## 2025-06-19 PROCEDURE — 99239 HOSP IP/OBS DSCHRG MGMT >30: CPT | Performed by: HOSPITALIST

## 2025-06-19 PROCEDURE — 70551 MRI BRAIN STEM W/O DYE: CPT

## 2025-06-19 PROCEDURE — 92610 EVALUATE SWALLOWING FUNCTION: CPT

## 2025-06-19 PROCEDURE — 85610 PROTHROMBIN TIME: CPT | Performed by: HOSPITALIST

## 2025-06-19 PROCEDURE — 93306 TTE W/DOPPLER COMPLETE: CPT

## 2025-06-19 PROCEDURE — 80061 LIPID PANEL: CPT | Performed by: FAMILY MEDICINE

## 2025-06-19 PROCEDURE — 83036 HEMOGLOBIN GLYCOSYLATED A1C: CPT | Performed by: FAMILY MEDICINE

## 2025-06-19 PROCEDURE — 85027 COMPLETE CBC AUTOMATED: CPT | Performed by: FAMILY MEDICINE

## 2025-06-19 PROCEDURE — 99255 IP/OBS CONSLTJ NEW/EST HI 80: CPT | Performed by: STUDENT IN AN ORGANIZED HEALTH CARE EDUCATION/TRAINING PROGRAM

## 2025-06-19 PROCEDURE — 93306 TTE W/DOPPLER COMPLETE: CPT | Performed by: INTERNAL MEDICINE

## 2025-06-19 RX ORDER — METOPROLOL SUCCINATE 100 MG/1
50 TABLET, EXTENDED RELEASE ORAL EVERY MORNING
Qty: 15 TABLET | Refills: 0 | Status: SHIPPED | OUTPATIENT
Start: 2025-06-19 | End: 2025-06-20

## 2025-06-19 RX ORDER — LISINOPRIL 2.5 MG/1
2.5 TABLET ORAL EVERY MORNING
Qty: 30 TABLET | Refills: 0 | Status: SHIPPED | OUTPATIENT
Start: 2025-06-19

## 2025-06-19 RX ORDER — ASPIRIN 81 MG/1
81 TABLET, CHEWABLE ORAL DAILY
Qty: 30 TABLET | Refills: 0 | Status: SHIPPED | OUTPATIENT
Start: 2025-06-19

## 2025-06-19 RX ADMIN — WARFARIN SODIUM 5 MG: 5 TABLET ORAL at 18:33

## 2025-06-19 RX ADMIN — NICOTINE 21 MG: 21 PATCH, EXTENDED RELEASE TRANSDERMAL at 18:34

## 2025-06-19 RX ADMIN — LISINOPRIL 2.5 MG: 5 TABLET ORAL at 08:26

## 2025-06-19 RX ADMIN — ENOXAPARIN SODIUM 80 MG: 80 INJECTION SUBCUTANEOUS at 22:30

## 2025-06-19 RX ADMIN — ASPIRIN 81 MG: 81 TABLET, CHEWABLE ORAL at 08:25

## 2025-06-19 RX ADMIN — ENOXAPARIN SODIUM 80 MG: 80 INJECTION SUBCUTANEOUS at 08:26

## 2025-06-19 RX ADMIN — ATORVASTATIN CALCIUM 40 MG: 40 TABLET, FILM COATED ORAL at 18:33

## 2025-06-19 NOTE — ASSESSMENT & PLAN NOTE
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 S/p aortic valve replacement, and tobacco use presented to ED due to transitory aphasia and forgetfulness. CAT scan showed age-indeterminate hypodense lacunar infarct in the left caudate and left cerebellum. He was started on stroke pathway. INR subtherapeutic. Patient states his Coumadin clinic thinks he was INR between 1.5 and 2. MRI shows no acute infarction and chronic microangiopathic changes and chronic infarctions.    Continue Lovenox 1 mg/kg every 12 hours  Continue Warfarin 5 mg daily  Current INR 1.31 - subtherapeutic.    Consulted neurology  MRI of the brain and echo  Consulted PT/OT/case management    Other labs:  Lipid panel shows increased triglycerides (177) and decreased HDL (31)  TSH WNL  Increased BUN

## 2025-06-19 NOTE — OCCUPATIONAL THERAPY NOTE
"    Occupational Therapy Evaluation     Patient Name: Isidoro Kathleen  Today's Date: 6/19/2025  Problem List  Principal Problem:    Acute lacunar infarction (HCC)  Active Problems:    Chronic thoracic aortic dissection (HCC)    ACTA2-related familial thoracic aortic aneurysm    H/O: CVA (cerebrovascular accident)    S/P AVR (aortic valve replacement)    Tobacco use disorder    Past Medical History  Past Medical History[1]  Past Surgical History  Past Surgical History[2]        06/19/25 1044   OT Last Visit   OT Visit Date 06/19/25   Note Type   Note type Evaluation   Pain Assessment   Pain Assessment Tool 0-10   Pain Score No Pain   Restrictions/Precautions   Weight Bearing Precautions Per Order No   Other Precautions Fall Risk   Home Living   Type of Home House   Home Layout Multi-level;Bed/bath upstairs  (FFOS to second level)   Bathroom Shower/Tub Walk-in shower   Bathroom Toilet Standard   Bathroom Accessibility Accessible   Additional Comments pt reports no use of DME at baseline   Prior Function   Level of Fifty Six Independent with ADLs;Independent with functional mobility;Independent with IADLS   Lives With Family   Receives Help From Family   IADLs Independent with driving;Independent with meal prep;Independent with medication management   Falls in the last 6 months 1 to 4   Vocational Full time employment   Comments fork  at [employer]   Subjective   Subjective \"Should I speed walk\"   ADL   Where Assessed Edge of bed   Eating Assistance 7  Independent   Grooming Assistance 7  Independent   UB Bathing Assistance 7  Independent   LB Bathing Assistance 7  Independent   UB Dressing Assistance 7  Independent   LB Dressing Assistance 7  Independent   Toileting Assistance  7  Independent   Additional Comments Given fxl performance skills + medical complexity, therapist suspecting via clinical judgement + skilled analysis; pt currently requires stated assist above to perform each area of ADLs.   Bed " Mobility   Additional Comments session began with pt in transport chair returning from diagnostic testing; pt seated OOB in chair at end of session   Transfers   Sit to Stand 7  Independent   Stand to Sit 7  Independent   Additional Comments no DME utilized; pt on RA throughout session with SpO2 remaining WFL throughout session and no complaints of SOB   Functional Mobility   Functional Mobility 7  Independent   Additional Comments pt participated in (I) ~250 ft functional mobility appropriate for community distances; pt demonstrated slight deviations throughout FM   Additional items   (no DME utilized)   Balance   Static Sitting Good   Dynamic Sitting Good   Static Standing Fair +   Dynamic Standing Fair +   Ambulatory Fair   Activity Tolerance   Activity Tolerance Patient tolerated treatment well   RUE Assessment   RUE Assessment WFL   LUE Assessment   LUE Assessment WFL   Hand Function   Gross Motor Coordination Functional   Fine Motor Coordination Functional   Sensation   Light Touch No apparent deficits   Sharp/Dull No apparent deficits   Psychosocial   Psychosocial (WDL) WDL   Cognition   Overall Cognitive Status Impaired   Arousal/Participation Alert;Responsive   Attention Within functional limits   Orientation Level Oriented to person;Oriented to place;Oriented to time;Disoriented to situation   Memory Within functional limits   Following Commands Follows all commands and directions without difficulty   Assessment   Prognosis Good   Assessment Patient is a 49 y.o. male admitted to [facility] on 6/18/2025 due to Acute lacunar infarction (HCC). OT order placed to assess Pt's ADLs, cognitive status, and performance during functional tasks in order to maximize safety and independence while making most appropriate d/c recommendations. An occupational profile and medical/therapy history were completed, including a brief review of physical, cognitive, psychosocial history related to pt’s current functional  performance. Pt with PMHx impacting their performance during ADL tasks include: stroke, ACTA2-related thoracic aortic aneurysm. Pt performed at (I) level with no OT needs identified at this time. In consideration of history obtained, identification of performance deficits, comorbidities that affect occupational performance, clinical presentation/decision-making, and modification of tasks/assistance required to enable pt to complete evaluation components, this evaluation is determined to be of low complexity. The patient's raw score on the AM-PAC Daily Activity Inpatient Short Form is 24. A raw score of greater than or equal to 19 suggests the patient may benefit from discharge to home. Please refer to the recommendation of the Occupational Therapist for safe discharge planning.  From OT standpoint recommend anticipate no needs. No further acute OT needs identified at this time. Recommend continued mobilization with hospital staff and restorative services while in the hospital to increase pt’s endurance and strength upon D/C.   Goals   Patient Goals to go home   Discharge Recommendation   Rehab Resource Intensity Level, OT No post-acute rehabilitation needs   AM-PAC Daily Activity Inpatient   Lower Body Dressing 4   Bathing 4   Toileting 4   Upper Body Dressing 4   Grooming 4   Eating 4   Daily Activity Raw Score 24   Daily Activity Standardized Score (Calc for Raw Score >=11) 57.54   AM-PAC Applied Cognition Inpatient   Following a Speech/Presentation 3   Understanding Ordinary Conversation 4   Taking Medications 4   Remembering Where Things Are Placed or Put Away 4   Remembering List of 4-5 Errands 4   Taking Care of Complicated Tasks 3   Applied Cognition Raw Score 22   Applied Cognition Standardized Score 47.83   SLUMS   What day of the week is it? 1   What is the year? 1   What state are we in? 1   How much did you spend? 0   How much do you have left? 0   Name animals? 3   Oject recall? 4   Repeat numbers  backwards? 2   Draw a clock? 0   Identify a triangle 1   Determine size? 1   What was the females name? 2   When did she go back to work? 0   What work did she do? 2   What state did she live in? 2   Calculated SLUMS Score 20   SLUMS Comments indicative of dementia; pt reports high school degree               [1]   Past Medical History:  Diagnosis Date    Hypertension     Stroke (HCC)    [2]   Past Surgical History:  Procedure Laterality Date    AORTIC VALVE REPLACEMENT N/A

## 2025-06-19 NOTE — CONSULTS
Consultation - Neurology   Name: Isidoro Kathleen 49 y.o. male I MRN: [id number]  Unit/Bed#: 410-01 I Date of Admission: 6/18/2025   Date of Service: 6/19/2025 I Hospital Day: 1   Inpatient consult to Neurology  Consult performed by: Renetta Kessler MD  Consult ordered by: Mike Silva MD        Physician Requesting Evaluation: Bro Christensen Counts, DO   Reason for Evaluation / Principal Problem: CTH with subacute stroke and subtherapeutic INR    Assessment & Plan  Acute lacunar infarction (HCC)  Isidoro Kathleen is a 49 y.o.  male with aortic dissection with graft repair in 2001 and revision 2006; embolic stroke with L-sided infarcts post-operative,  mechanical aortic valve placed 2019 on warfarin who presents with transient global amnesia lasting 7 hours at work 2 days prior. CTA with age-indeterminate hypodense lacunar infarcts in left caudate and left cerebellum and INR subtherapeutic to 1.3 so patient admitted for stroke work up. His INR clinic think he need to be 1.5-2. No seizures lately.  MRI brain without acute ischemic stroke.     Echo completed, official read pending   Continue Warfarin and lovenox bridge    on seizure precaution and driving precautions     ACTA2-related familial thoracic aortic aneurysm  Continue outpatient follow up.  H/O: CVA (cerebrovascular accident)  See above  S/P AVR (aortic valve replacement)  Continue warfarin  Tobacco use disorder   on cessation. 3/4 ppd.   He work at [employer] and he smokes because of stress.   Recommend talking to PCP for mood management.         Isidoro Kathleen will need follow up in in 6 weeks with general attending or advance practitioner. He will not require outpatient neurological testing.    History of Present Illness   Isidoro Kathleen is a 49 y.o.  male with aortic dissection with graft repair in 2001 and revision 2006; embolic stroke with L-sided infarcts post-operative,  mechanical aortic valve placed 2019 on warfarin who presents with  transient global amnesia lasting 7 hours at work 2 days prior. Patient had similar episode before with his stroke and diagnosed with partial complex seizure and Keppra wean off in 2018 by EMU.   CTA with age-indeterminate hypodense lacunar infarcts in left caudate and left cerebellum and INR subtherapeutic to 1.3 so patient admitted for stroke work up. His INR clinic think he need to be 1.5-2.       Review of Systems   Constitutional symptoms: no weakness, no fever, no chills, no sweats.   Skin symptoms: no rash.   Eye:  no vision changes/disturbances, Blurry vision  ENMT:   no dizziness  Respiratory symptoms: no shortness of breath, no cough.   Cardiovascular symptoms: no chest pain.   Gastrointestinal symptoms: no nausea, no vomiting, no diarrhea.   Hema/Lymph:    no problems  Endocrine:    no Cold intolerance, Heat intolerance  Immunologic:    no problems  Musculoskeletal symptoms: no back pain.  Integumentary:    no problems   Neurologic symptoms: no headache, no dizziness, no weakness, no altered level of consciousness, no numbness, no tingling. No Abnormal balance nor falls, memory issues   Psych: No SI/HI  All other system reviewed and negative except positives noted above    Medical History Review: I have reviewed the patient's PMH, PSH, Social History, Family History, Meds, and Allergies   Historical Information   Past Medical History[1]  Past Surgical History[2]  Social History[3]  E-Cigarette/Vaping     E-Cigarette/Vaping Substances     Family History[4]  Social History[5]    Current Facility-Administered Medications:     acetaminophen (TYLENOL) tablet 650 mg, Q6H PRN    aspirin chewable tablet 81 mg, Daily    atorvastatin (LIPITOR) tablet 40 mg, QPM    enoxaparin (LOVENOX) subcutaneous injection 80 mg, Q12H NICOLETTE    lisinopril (ZESTRIL) tablet 2.5 mg, QAM    [Held by provider] metoprolol succinate (TOPROL-XL) 24 hr tablet 75 mg, QAM    nicotine (NICODERM CQ) 21 mg/24 hr TD 24 hr patch 21 mg, QPM     warfarin (COUMADIN) tablet 5 mg, Daily (warfarin)  Prior to Admission Medications   Prescriptions Last Dose Informant Patient Reported? Taking?   aspirin 81 mg chewable tablet 6/18/2025 at  9:00 AM  Yes Yes   Sig: Chew 81 mg daily   lisinopril (ZESTRIL) 2.5 mg tablet 6/18/2025  Yes Yes   Sig: Take 2.5 mg by mouth every morning   metoprolol succinate (TOPROL-XL) 100 mg 24 hr tablet 6/18/2025  Yes Yes   Sig: Take 100 mg by mouth every morning   warfarin (COUMADIN) 5 mg tablet 6/18/2025  Yes Yes   Sig: Take 5 mg by mouth daily CURRENT DOSE IS 2.5 MG MONDAYS AND FRIDAYS AND 5 MG ALL OTHER DAYS OF THE WEEK      Facility-Administered Medications: None     Patient has no known allergies.    Objective :  Temp:  [97.1 °F (36.2 °C)-99.3 °F (37.4 °C)] 99.3 °F (37.4 °C)  HR:  [51-64] 55  BP: (114-148)/(61-87) 143/87  Resp:  [16-18] 16  SpO2:  [93 %-97 %] 95 %  O2 Device: None (Room air)    Physical Exam  Modified PE as this is a video consultation:  Gen:   NAD.  HEENT:  No Septal deviation EOMI NCAT.  Resp:  Symmetric chest rise and patient in no obvious respiratory distress  MSK: ROM normal  Skin: No rash noted in visualized portion of this exam    Neurological Exam  Awake, alert, oriented x 4 name, age, month ,year. No aphasia or dysarthria confusion noted.  Patient is able to follow 2 and 3 step commands with ease  CN 2-12 grossly intact, EOMI,  no facial asymmetry with eye brow raise or smile,  including finger rub symmetric on both sides- instructed pt on how to do this, V1-3 done with help of patient touching her own face in those distributions as instructed normal to LT, hearing intact to conversation,  no tongue deviation, symmetric shoulder shrug and side bending and neck rotation  Motor:  Intact antigravity x 4 extremities. No Pronator drift noted.  Sensation: Intact to light touch, instructed patient on how to do this in all extremities proximal and distal.  Cerebellar: No dysmetria b/l with FNF testing. JENNIFER with no  dysdiadochokinesia  Gait:  deferred.     NIHSS 0         Lab Results: I have reviewed the following results:I have personally reviewed pertinent reports.  , CBC:   Results from last 7 days   Lab Units 06/19/25  0455 06/18/25  1507   WBC Thousand/uL 5.84 7.38   RBC Million/uL 4.78 4.94   HEMOGLOBIN g/dL 14.6 15.3   HEMATOCRIT % 42.1 43.8   MCV fL 88 89   PLATELETS Thousands/uL 206 245   , BMP/CMP:   Results from last 7 days   Lab Units 06/19/25  0455 06/18/25  1507   SODIUM mmol/L 139 142   POTASSIUM mmol/L 4.0 4.0   CHLORIDE mmol/L 109* 105   CO2 mmol/L 25 31   BUN mg/dL 24 27*   CREATININE mg/dL 0.99 1.28   CALCIUM mg/dL 8.6 9.3   EGFR ml/min/1.73sq m 87 64   , HgBA1C:   , Lipid Profile:   Results from last 7 days   Lab Units 06/19/25  0455   HDL mg/dL 31*   LDL CALC mg/dL 72   TRIGLYCERIDES mg/dL 177*     Recent Labs     06/18/25  1507 06/19/25  0455   WBC 7.38 5.84   HGB 15.3 14.6   HCT 43.8 42.1    206   SODIUM 142 139   K 4.0 4.0    109*   CO2 31 25   BUN 27* 24   CREATININE 1.28 0.99   GLUC 108 82   MG 2.4  --      Imaging Results Review: I personally reviewed the following image studies in PACS and associated radiology reports: CT head and MRI brain. My interpretation of the radiology images/reports is: as below.    MRI brain wo contrast   Final Result by Mart Garsia MD (06/19 1031)      1.  No acute infarction.   2.  Chronic microangiopathic changes and chronic infarctions as above      Workstation performed: [id number]         CTA dissection protocol chest/abdomen/pelvis   Final Result by Corwin Kaur MD (06/18 9673)      1. Prosthetic aortic valve and ascending in the graft repair. Type B dissection with patent false lumen and mild aneurysmal dilatation of the descending thoracic aorta.   2. No evidence of acute process in the chest, abdomen or pelvis.            Computerized Assisted Algorithm (CAA) may have aided analysis of applicable images.         Workstation performed:  [id number]         CTA head and neck with and without contrast   Final Result by Rayo Carrion MD (06/18 1705)      CT Brain:   - Age-indeterminate hypodense lacunar infarcts in left caudate and left cerebellum. Recommend MRI brain without contrast for further evaluation.   - Mild chronic microangiopathy.      CT Angiography:   - Negative CTA head and neck for large vessel occlusion, dissection, aneurysm, or high-grade stenosis.      Partially imaged postsurgical repair of aortic dissection - please see same day CTA dissection protocol for further evaluation..      Additional chronic/incidental findings as detailed above.      The study was marked in EPIC for immediate notification.                  Workstation performed: [id number]                 VTE Prophylaxis: VTE covered by:  enoxaparin, Subcutaneous, 80 mg at 06/19/25 0826  warfarin, Oral       Administrative Statements   VIRTUAL CARE DOCUMENTATION:     1. This service was provided via Telemedicine using [email or web address]     2. Parties in the room with patient during teleconsult Family member: daughter , Echo tech    3. Confidentiality My office door was closed     4. Participants No one else was in the room    5. Patient acknowledged consent and understanding of privacy and security of the  Telemedicine consult. I informed the patient that I have reviewed their record in Epic and presented the opportunity for them to ask any questions regarding the visit today.  The patient agreed to participate.    6. I have spent a total time of 50 minutes in caring for this patient on the day of the visit/encounter including Diagnostic results, Prognosis, Risks and benefits of tx options, Instructions for management, Patient and family education, Impressions, and Communicating with other healthcare professionals , not including the time spent for establishing the audio/video connection.           [1]   Past Medical History:  Diagnosis Date    Hypertension     Stroke  ([facility])    [2]   Past Surgical History:  Procedure Laterality Date    AORTIC VALVE REPLACEMENT N/A    [3]   Social History  Tobacco Use    Smoking status: Former     Types: Cigarettes    Smokeless tobacco: Never   Substance and Sexual Activity    Alcohol use: Not Currently    Drug use: Never   [4] No family history on file.  [5]   Social History  Tobacco Use    Smoking status: Former     Types: Cigarettes    Smokeless tobacco: Never   Substance and Sexual Activity    Alcohol use: Not Currently    Drug use: Never

## 2025-06-19 NOTE — PHYSICAL THERAPY NOTE
PHYSICAL THERAPY        Patient Name: Isidoro Kathleen  Today's Date: 6/19/2025 06/19/25 1318   PT Last Visit   PT Visit Date 06/19/25   Note Type   Note type Screen   Cancel Reasons Other     Order received and chart review performed. Per OT evaluation, pt is performing functional mobility independently with no device. Pt does not require skilled PT intervention at this time; will d/c PT orders.    Shanna Alberts PT, DPT

## 2025-06-19 NOTE — ASSESSMENT & PLAN NOTE
50 yo male with history of stroke on warfarin, ACTH due to mutation. S/p aortic valve replacement, and tobacco use presented to ED due to transitory aphasia and forgetfulness. CAT scan showed age-indeterminate hypodense lacunar infarct in the left caudate and left cerebellum. He was started on stroke pathway. INR subtherapeutic. Patient states his Coumadin clinic thinks he was INR between 1.5 and 2. MRI shows no acute infarction and chronic microangiopathic changes and chronic infarctions.    Patient offered short course of Lovenox at home to reach INR therapeutic levels but patient declines  Continue Warfarin 5 mg daily  Current INR 1.4 - subtherapeutic, patient opted to discharge home with follow-up with PCP and coumadin clinic  Consulted neurology  MRI of the brain showed no acute infarcts and echo had EF of 60%   Consulted PT/OT/case management    Other labs:  Lipid panel shows increased triglycerides (177) and decreased HDL (31)  TSH WNL  Increased BUN

## 2025-06-19 NOTE — PROGRESS NOTES
Progress Note - Hospitalist   Name: Isidoro Kathleen 49 y.o. male I MRN: [id number]  Unit/Bed#: 410-01 I Date of Admission: 6/18/2025   Date of Service: 6/19/2025 I Hospital Day: 1    Assessment & Plan  Acute lacunar infarction (HCC)  48 yo male with history of stroke on warfarin, ACTH due to mutation. S/p aortic valve replacement, and tobacco use presented to ED due to transitory aphasia and forgetfulness. CAT scan showed age-indeterminate hypodense lacunar infarct in the left caudate and left cerebellum. He was started on stroke pathway. INR subtherapeutic. Patient states his Coumadin clinic thinks he was INR between 1.5 and 2. MRI shows no acute infarction and chronic microangiopathic changes and chronic infarctions.    Continue Lovenox 1 mg/kg every 12 hours  Continue Warfarin 5 mg daily  Current INR 1.31 - subtherapeutic.    Consulted neurology  MRI of the brain and echo  Consulted PT/OT/case management    Other labs:  Lipid panel shows increased triglycerides (177) and decreased HDL (31)  TSH WNL  Increased BUN  H/O: CVA (cerebrovascular accident)  Currently on warfarin which is subtherapeutic  Current INR is 1.31  Bridged with Lovenox 1 mg/kg every 12 hours and warfarin 5 mg daily  Repeat INR pending  ACTA2-related familial thoracic aortic aneurysm  Follows with cardiology  Status post aortic dissection repair in 2002 with revision in 2006  Continue aspirin 81 mg daily and Toprol  mg daily  S/P AVR (aortic valve replacement)  On-x Valve 2019  Followed by cardiology  Continue PTA lisinopril 2.5 mg   Hold metoprolol 75 mg  Tobacco use disorder  Still smokes three fourths of a pack per day  Placed on nicotine patch 21 mg  Chronic thoracic aortic dissection (HCC)  CT findings: 1. Prosthetic aortic valve and ascending in the graft repair. Type B dissection with patent false lumen and mild aneurysmal dilatation of the descending thoracic aorta.  2. No evidence of acute process in the chest, abdomen or  pelvis.    VTE Pharmacologic Prophylaxis: VTE Score: 7 High Risk (Score >/= 5) - Pharmacological DVT Prophylaxis Ordered: and Lovenox for bridging. Sequential Compression Devices Ordered.    Mobility:   Basic Mobility Inpatient Raw Score: 24  JH-HLM Goal: 8: Walk 250 feet or more  JH-HLM Achieved: 6: Walk 10 steps or more  JH-HLM Goal achieved. Continue to encourage appropriate mobility.    Patient Centered Rounds: I performed bedside rounds with nursing staff today.   Discussions with Specialists or Other Care Team Provider: attending    Education and Discussions with Family / Patient: Updated  (daughter) at bedside.    Current Length of Stay: 1 day(s)  Current Patient Status: Inpatient   Certification Statement: The patient will continue to require additional inpatient hospital stay due to acute stroke pathway  Discharge Plan: Anticipate discharge in 24-48 hrs to home.    Code Status: Level 1 - Full Code    Subjective   Patient examined at bedside. Patient had decreased in HR overnight but patient sleeping and asymptomatic. Patient denies any complains.    Objective :  Temp:  [97.1 °F (36.2 °C)-99.3 °F (37.4 °C)] 99.3 °F (37.4 °C)  HR:  [51-64] 55  BP: (114-148)/(61-87) 143/87  Resp:  [16-18] 16  SpO2:  [93 %-97 %] 95 %  O2 Device: None (Room air)    Body mass index is 23.92 kg/m².     Input and Output Summary (last 24 hours):     Intake/Output Summary (Last 24 hours) at 6/19/2025 1106  Last data filed at 6/19/2025 0844  Gross per 24 hour   Intake 674 ml   Output --   Net 674 ml       Physical Exam  Vitals and nursing note reviewed.   Constitutional:       General: He is not in acute distress.     Appearance: He is well-developed.   HENT:      Head: Normocephalic and atraumatic.     Eyes:      Conjunctiva/sclera: Conjunctivae normal.       Cardiovascular:      Rate and Rhythm: Normal rate and regular rhythm.      Heart sounds: Murmur heard.      Comments: Audible prosthetic valve  Pulmonary:       Effort: Pulmonary effort is normal. No respiratory distress.      Breath sounds: Normal breath sounds.   Abdominal:      Palpations: Abdomen is soft.      Tenderness: There is no abdominal tenderness.     Musculoskeletal:         General: No swelling.      Cervical back: Neck supple.     Skin:     General: Skin is warm and dry.      Capillary Refill: Capillary refill takes less than 2 seconds.     Neurological:      Mental Status: He is alert.     Psychiatric:         Mood and Affect: Mood normal.         Telemetry:  Telemetry Orders (From admission, onward)               24 Hour Telemetry Monitoring  Continuous x 24 Hours (Telem)        Expiring   Question:  Reason for 24 Hour Telemetry  Answer:  TIA/Suspected CVA/ Confirmed CVA                     Telemetry Reviewed: Normal Sinus Rhythm  Indication for Continued Telemetry Use: Acute CVA               Lab Results: I have reviewed the following results:   Results from last 7 days   Lab Units 06/19/25  0455 06/18/25  1507   WBC Thousand/uL 5.84 7.38   HEMOGLOBIN g/dL 14.6 15.3   HEMATOCRIT % 42.1 43.8   PLATELETS Thousands/uL 206 245   SEGS PCT %  --  53   LYMPHO PCT %  --  35   MONO PCT %  --  8   EOS PCT %  --  3     Results from last 7 days   Lab Units 06/19/25  0455   SODIUM mmol/L 139   POTASSIUM mmol/L 4.0   CHLORIDE mmol/L 109*   CO2 mmol/L 25   BUN mg/dL 24   CREATININE mg/dL 0.99   ANION GAP mmol/L 5   CALCIUM mg/dL 8.6   GLUCOSE RANDOM mg/dL 82     Results from last 7 days   Lab Units 06/18/25  1507   INR  1.31*                   Recent Cultures (last 7 days):         Imaging Results Review: I reviewed radiology reports from this admission including: CT chest.  Other Study Results Review: No additional pertinent studies reviewed.    Last 24 Hours Medication List:     Current Facility-Administered Medications:     acetaminophen (TYLENOL) tablet 650 mg, Q6H PRN    aspirin chewable tablet 81 mg, Daily    atorvastatin (LIPITOR) tablet 40 mg, QPM    enoxaparin  (LOVENOX) subcutaneous injection 80 mg, Q12H NICOLETTE    lisinopril (ZESTRIL) tablet 2.5 mg, QAM    [Held by provider] metoprolol succinate (TOPROL-XL) 24 hr tablet 75 mg, QAM    nicotine (NICODERM CQ) 21 mg/24 hr TD 24 hr patch 21 mg, QPM    warfarin (COUMADIN) tablet 5 mg, Daily (warfarin)    Administrative Statements   Today, Patient Was Seen By: Deneen Simons MD  I have spent a total time of 30 minutes in caring for this patient on the day of the visit/encounter including Documenting in the medical record, Reviewing/placing orders in the medical record (including tests, medications, and/or procedures), Obtaining or reviewing history  , and Communicating with other healthcare professionals .    **Please Note: This note may have been constructed using a voice recognition system.**

## 2025-06-19 NOTE — SPEECH THERAPY NOTE
"Speech-Language Pathology Bedside Swallow Evaluation      Patient Name: Isidoro Kathleen  Today's Date: 6/19/2025     Problem List  Principal Problem:    Acute lacunar infarction (HCC)  Active Problems:    Chronic thoracic aortic dissection (HCC)    ACTA2-related familial thoracic aortic aneurysm    H/O: CVA (cerebrovascular accident)    S/P AVR (aortic valve replacement)    Tobacco use disorder      Past Medical History  Past Medical History[1]    Past Surgical History  Past Surgical History[2]    Summary:  Pt presented with functional appearing oral and pharyngeal stage swallowing skills with materials administered today.  He denies any difficulty in tolerance of regular solids and thin liquids via cup or straw.  Patient verbalizing onset of symptoms do not appear to include dysphagia, dysarthria, or other aphasia patterns.  Patient engaged in complex conversation, no formal language or cognitive evaluation warranted at this time.    Pt reports he has gotten feedback that during these \"seizure like\" events he appears/reports that he is very tired.  Neurology with dx of amnesia for events, suspect minimal sleep, excessive stress, and extenuating factors influence the onset timing and severity.  Patient reports that he began a new job ~2 months ago and his current work schedule was supposed to be 36 hours (3-12 hour shifts 7pm-7am) Friday Saturday Sunday.  Unfortunately since hire patient has been mandated an additional 18 hours of work per week (10 hours Monday and 8 hours Tuesday).  Patient has Wednesdays and Thursdays to \"recover\" before returning for his five day/54 hour work stretch.  Patient estimates that his first episode in this same presentation was about ~6 weeks into the job pattern and about 2 weeks ago was hospitalized with similar symptoms.  Unfortunately symptoms much worse today as reported by patient to clinician.    Discussed that without applicable work schedule changes patient will likely see ongoing " patterns of sleeplessness, stress, and work schedule load.  This may present as additional events, causing hospitalizations and ongoing worsening symptom presentation.  Patient verbalized his understanding.  Patient expressing concern surrounding suffering another stroke as well as the long term effects of seizures.  Pt without health insurance at this time and is unable to afford seizure medication so he self tapered.  No formal dysphagia s/s displayed today.  He is conversational at complex levels and demonstrates understanding of education.    Risk/s for Aspiration:  None outside of isolated events    Recommended Diet: regular diet and thin liquids   Recommended Form of Meds: whole with liquid   Aspiration precautions and swallowing strategies: upright posture and small bites/sips  Other Recommendations: Continue frequent oral care    Current Medical Status:  Pt is a 49 y.o. male with noted past medical history presents to emergency department for evaluation of episode occurring 2 days prior while at work.  States that he had a 7-hour period for which he has total nausea, not being aware of what happened during that timeframe.  He did speak with his supervisor who confirmed that he was working during that timeframe and indeed had a typical output for his workplace (works as a  in an [employer]). Had prior episode 2 weeks before this lasting about 4 hours, again with similar nature with amnesia for the events.    Current Precautions:  None at this time  Allergies:  No known food allergies  Past medical history:  Please see H&P for details  Special Studies:  MRI brain wo contrast 6/19/25-   IMPRESSION:  1.  No acute infarction.  2.  Chronic microangiopathic changes and chronic infarctions as above    Social/Education/Vocational Hx:  Pt lives with family    Swallow Information   Current Risks for Dysphagia & Aspiration: seizure vs CVA r/o  Current Symptoms/Concerns: Vacant expression, reports  s/s of neurological work up  Current Diet: regular diet and thin liquids   Baseline Diet: regular diet and thin liquids    Baseline Assessment   Behavior/Cognition: alert  Speech/Language Status: able to participate in conversation and able to follow commands  Patient Positioning: edge of bed  Pain Status/Interventions/Response to Interventions:  No report of or nonverbal indications of pain.    Swallow Mechanism Exam  Facial: symmetrical  Labial: WFL  Lingual: WFL  Velum: symmetrical  Mandible: adequate ROM  Dentition: adequate  Vocal quality:clear/adequate   Volitional Cough: strong/productive   Respiratory Status: on RA     Consistencies Assessed and Performance   Consistencies Administered: thin liquids, soft solids, and hard solids  Materials administered included water via straw, crab cakes, green beans, mashed potatoes    Oral Stage: WFL  Mastication was adequate with the materials administered today.  Bolus formation and transfer were functional with no significant oral residue noted.  No overt s/s reduced oral control.    Pharyngeal Stage: WFL  Swallow Mechanics:  Swallowing initiation appeared prompt.  Laryngeal rise was palpated and judged to be within functional limits.  No coughing, throat clearing, change in vocal quality or respiratory status noted today.     Esophageal Concerns: none reported    Strategies and Efficacy:  small bites, small sips, slow rate    Summary and Recommendations (see above)  Results Reviewed with: patient, MD, and family   Treatment Recommended:  None at this time     Speech Therapy Prognosis   Prognosis: good and contingent on ongoing health  Prognosis Considerations: medically fragile status       [1]   Past Medical History:  Diagnosis Date    Hypertension     Stroke (HCC)    [2]   Past Surgical History:  Procedure Laterality Date    AORTIC VALVE REPLACEMENT N/A

## 2025-06-19 NOTE — UTILIZATION REVIEW
Initial Clinical Review    Admission: Date/Time/Statement:   Admission Orders (From admission, onward)       Ordered        06/18/25 1820  INPATIENT ADMISSION  Once                          Orders Placed This Encounter   Procedures    INPATIENT ADMISSION     Standing Status:   Standing     Number of Occurrences:   1     Level of Care:   Med Surg [16]     Estimated length of stay:   More than 2 Midnights     Certification:   I certify that inpatient services are medically necessary for this patient for a duration of greater than two midnights. See H&P and MD Progress Notes for additional information about the patient's course of treatment.     ED Arrival Information       Expected   6/18/2025     Arrival   6/18/2025 14:32    Acuity   Urgent              Means of arrival   Walk-In    Escorted by   Family Member    Service   Hospitalist    Admission type   Emergency              Arrival complaint   Work related injury             Chief Complaint   Patient presents with    Altered Mental Status     Patient reports that he has had a hard time getting out words and forgetting days since 2001       Initial Presentation: 49 y.o. male  with a PMH of history of stroke in the past on warfarin, ACTH 2 related to familial mutation, status post aortic valve replacement, tobacco use disorder who presents with hard time getting out words and forgetting days. Patient is accompanied by his daughter, the patient has had some issues this week twice apparently at work. The patient was seen in urgent care and referred to the emergency room. On CAT scan he has had a age-indeterminate lacunar infarct in the left caudate and left cerebellum. The patient has been taking his medications at home, and is followed by the [facility] cardiology clinic for his Coumadin. Plan: Inpatient admission for evaluation and treatment of acute lacunar infarct, hx of CVA, ACTA2 thoracic aortic aneurysm, tobacco abuse: continue warfarin 5 mg daily, Neurology  consult, MRI brain, Echo, PT/OT eval, INR in am, continue ASA 81 mg, Toprol XL, start NRT.     Anticipated Length of Stay/Certification Statement: Patient will be admitted on an inpatient basis with an anticipated length of stay of greater than 2 midnights secondary to acute CVA.     Date: 6/19   Day 2:     Neurology consult: Echo completed, official read pending. Continue Warfarin and lovenox bridge.     Internal medicine: continue warfarin 5 mg daily. Current INR 1.31 - subtherapeutic. Bridged with Lovenox 1 mg/kg every 12 hours. MRI brain. Echo. PT/OT consult. Continue ASA 81 mg, Toprol XL, lisinopril, NRT, hold metoprolol.      ED Treatment-Medication Administration from 06/18/2025 1409 to 06/18/2025 1905         Date/Time Order Dose Route Action     06/18/2025 1608 iohexol (OMNIPAQUE) 350 MG/ML injection (MULTI-DOSE) 85 mL 85 mL Intravenous Given     06/18/2025 1625 iohexol (OMNIPAQUE) 350 MG/ML injection (MULTI-DOSE) 100 mL 100 mL Intravenous Given            Scheduled Medications:  aspirin, 81 mg, Oral, Daily  atorvastatin, 40 mg, Oral, QPM  enoxaparin, 1 mg/kg, Subcutaneous, Q12H NICOLETTE  lisinopril, 2.5 mg, Oral, QAM  [Held by provider] metoprolol succinate, 75 mg, Oral, QAM  nicotine, 21 mg, Transdermal, QPM  warfarin, 5 mg, Oral, Daily (warfarin)      Continuous IV Infusions:     PRN Meds:  acetaminophen, 650 mg, Oral, Q6H PRN      ED Triage Vitals [06/18/25 1442]   Temperature Pulse Respirations Blood Pressure SpO2 Pain Score   97.7 °F (36.5 °C) 64 18 132/74 93 % No Pain     Weight (last 2 days)       Date/Time Weight    06/19/25 11:15:21 77.8 (171.52)    06/18/25 19:10:44 77.8 (171.52)    06/18/25 1632 82.7 (182.32)            Vital Signs (last 3 days)       Date/Time Temp Pulse Resp BP MAP (mmHg) SpO2 O2 Device Patient Position - Orthostatic VS Pain    06/19/25 1156 98.6 °F (37 °C) -- -- -- -- 98 % None (Room air) -- No Pain    06/19/25 11:15:21 -- 54 -- 128/70 89 -- -- -- --    06/19/25 1044 -- -- -- --  -- -- -- -- No Pain    06/19/25 08:33:55 -- 55 -- 143/87 106 95 % -- -- --    06/19/25 08:26:33 -- 54 -- 148/85 106 97 % -- -- --    06/19/25 0826 -- -- -- 148/85 -- -- -- -- --    06/19/25 07:29:31 99.3 °F (37.4 °C) -- -- 118/73 88 -- -- -- --    06/19/25 03:10:42 97.1 °F (36.2 °C) 55 -- 120/71 87 97 % -- Lying --    06/18/25 23:06:26 98.3 °F (36.8 °C) 51 -- 120/71 87 97 % -- -- --    06/18/25 1929 -- -- -- -- -- -- -- -- No Pain    06/18/25 1922 -- -- -- -- -- -- None (Room air) -- No Pain    06/18/25 19:10:44 97.3 °F (36.3 °C) 59 16 121/74 90 93 % None (Room air) Sitting --    06/18/25 1633 -- -- -- 133/61 -- 94 % None (Room air) Sitting --    06/18/25 1530 -- 56 -- 114/64 83 95 % None (Room air) -- --    06/18/25 1500 -- 59 -- 127/63 90 95 % -- -- --    06/18/25 1443 -- 63 -- 132/72 96 96 % -- -- --    06/18/25 1442 97.7 °F (36.5 °C) 64 18 132/74 -- 93 % None (Room air) Sitting No Pain              Pertinent Labs/Diagnostic Test Results:   Radiology:  MRI brain wo contrast   Final Interpretation by Mart Garsia MD (06/19 6921)      1.  No acute infarction.   2.  Chronic microangiopathic changes and chronic infarctions as above      Workstation performed: [id number]         CTA dissection protocol chest/abdomen/pelvis   Final Interpretation by Corwin Kaur MD (06/18 1589)      1. Prosthetic aortic valve and ascending in the graft repair. Type B dissection with patent false lumen and mild aneurysmal dilatation of the descending thoracic aorta.   2. No evidence of acute process in the chest, abdomen or pelvis.            Computerized Assisted Algorithm (CAA) may have aided analysis of applicable images.         Workstation performed: [id number]         CTA head and neck with and without contrast   Final Interpretation by Rayo Carrion MD (06/18 1570)      CT Brain:   - Age-indeterminate hypodense lacunar infarcts in left caudate and left cerebellum. Recommend MRI brain without contrast for  further evaluation.   - Mild chronic microangiopathy.      CT Angiography:   - Negative CTA head and neck for large vessel occlusion, dissection, aneurysm, or high-grade stenosis.      Partially imaged postsurgical repair of aortic dissection - please see same day CTA dissection protocol for further evaluation..      Additional chronic/incidental findings as detailed above.      The study was marked in EPIC for immediate notification.                  Workstation performed: [id number]           Cardiology:  Echo complete w/ contrast if indicated   Final Result by Lawson Jenkins MD (06/19 1209)        Left Ventricle: Left ventricular cavity size is normal. Wall thickness    is increased. There is mild concentric hypertrophy. The left ventricular    ejection fraction is 60%. Systolic function is normal. Wall motion is    normal.     Left Atrium: The atrium is mildly dilated.     Right Atrium: The atrium is mildly dilated.     Aortic Valve: There is a bioprosthetic valve.  Peak gradient is 11    mmHg. There is trace regurgitation.     Mitral Valve: There is mild regurgitation.     Pulmonic Valve: There is mild regurgitation.     Aorta: The ascending aorta is repaired.     Pulmonary Artery: The estimated pulmonary artery systolic pressure is    28.0 mmHg.           GI:  No orders to display           Results from last 7 days   Lab Units 06/19/25  0455 06/18/25  1507   WBC Thousand/uL 5.84 7.38   HEMOGLOBIN g/dL 14.6 15.3   HEMATOCRIT % 42.1 43.8   PLATELETS Thousands/uL 206 245   TOTAL NEUT ABS Thousands/µL  --  3.97         Results from last 7 days   Lab Units 06/19/25  0455 06/18/25  1507   SODIUM mmol/L 139 142   POTASSIUM mmol/L 4.0 4.0   CHLORIDE mmol/L 109* 105   CO2 mmol/L 25 31   ANION GAP mmol/L 5 6   BUN mg/dL 24 27*   CREATININE mg/dL 0.99 1.28   EGFR ml/min/1.73sq m 87 64   CALCIUM mg/dL 8.6 9.3   MAGNESIUM mg/dL  --  2.4             Results from last 7 days   Lab Units 06/19/25  0455 06/18/25  1507    GLUCOSE RANDOM mg/dL 82 108           Results from last 7 days   Lab Units 06/18/25  1507   PROTIME seconds 16.8*   INR  1.31*     Results from last 7 days   Lab Units 06/18/25  1507   TSH 3RD GENERATON uIU/mL 3.283         Past Medical History[1]  Present on Admission:   Acute lacunar infarction (HCC)   Tobacco use disorder   Chronic thoracic aortic dissection (HCC)      Admitting Diagnosis: Transient global amnesia [G45.4]  Altered mental status [R41.82]  Cerebral infarct (HCC) [I63.9]  Subtherapeutic international normalized ratio (INR) [R79.1]  Age/Sex: 49 y.o. male    Network Utilization Review Department  ATTENTION: Please call with any questions or concerns to [phone] and carefully listen to the prompts so that you are directed to the right person. All voicemails are confidential.   For Discharge needs, contact Care Management DC Support Team at [phone] opt. 2  Send all requests for admission clinical reviews, approved or denied determinations and any other requests to dedicated fax number below belonging to the [facility] where the patient is receiving treatment. List of dedicated fax numbers for the Facilities:  FACILITY NAME UR FAX NUMBER   ADMISSION DENIALS (Administrative/Medical Necessity) [phone]   DISCHARGE SUPPORT TEAM (NETWORK) [phone]   PARENT CHILD HEALTH (Maternity/NICU/Pediatrics) [phone]   [facility] [phone]   [facility] [phone]   [facility] [phone]   [facility] [phone]   [facility] [phone]   [facility] [phone]   [facility] [phone]   [facility] [phone]   [facility] [phone]   [facility] [phone]   [facility] [phone]    [facility] [phone]              [1]   Past Medical History:  Diagnosis Date    Hypertension     Stroke (HCC)

## 2025-06-19 NOTE — ASSESSMENT & PLAN NOTE
Currently on warfarin which is subtherapeutic  Current INR is 1.4  Patient declined short course of Lovenox at home  Repeat INR as per coumadin and PCP clinic

## 2025-06-19 NOTE — ASSESSMENT & PLAN NOTE
On-x Valve 2019  Followed by cardiology  Continue PTA lisinopril 2.5 mg   Continue metoprolol at decreased dose of 50 mg due to bradycardia on admission.

## 2025-06-19 NOTE — ASSESSMENT & PLAN NOTE
Isidoro Kathleen is a 49 y.o.  male with aortic dissection with graft repair in 2001 and revision 2006; embolic stroke with L-sided infarcts post-operative,  mechanical aortic valve placed 2019 on warfarin who presents with transient global amnesia lasting 7 hours at work 2 days prior. CTA with age-indeterminate hypodense lacunar infarcts in left caudate and left cerebellum and INR subtherapeutic to 1.3 so patient admitted for stroke work up. His INR clinic think he need to be 1.5-2. No seizures lately.  MRI brain without acute ischemic stroke.     Echo completed, official read pending   Continue Warfarin and lovenox bridge    on seizure precaution and driving precautions

## 2025-06-19 NOTE — ASSESSMENT & PLAN NOTE
Currently on warfarin which is subtherapeutic  Current INR is 1.31  Bridged with Lovenox 1 mg/kg every 12 hours and warfarin 5 mg daily  Repeat INR pending

## 2025-06-19 NOTE — ASSESSMENT & PLAN NOTE
on cessation. 3/4 ppd.   He work at [employer] and he smokes because of stress.   Recommend talking to PCP for mood management.

## 2025-06-19 NOTE — QUICK NOTE
Events overnight  Notified by nursing that the patient's heart rate has dropped in the high 30s for few seconds several times on telemetry during the night.  The patient heart rate otherwise is in the 40s and 50s, patient sleeping is asymptomatic.  Will decrease his Toprol XL to 75 mg      Just wanted to make you aware that [facility] 410 HR has dropped to the high 30s for a few seconds several times on tele in the past hour. Pt HR otherwise in the 40s-50s. Pt sleeping, asymptomatic.

## 2025-06-19 NOTE — PLAN OF CARE
Problem: PAIN - ADULT  Goal: Verbalizes/displays adequate comfort level or baseline comfort level  Description: Interventions:  - Encourage patient to monitor pain and request assistance  - Assess pain using appropriate pain scale  - Administer analgesics as ordered based on type and severity of pain and evaluate response  - Implement non-pharmacological measures as appropriate and evaluate response  - Consider cultural and social influences on pain and pain management  - Notify physician/advanced practitioner if interventions unsuccessful or patient reports new pain  - Educate patient/family on pain management process including their role and importance of  reporting pain   - Provide non-pharmacologic/complimentary pain relief interventions  6/19/2025 1155 by Bel Plascencia RN  Outcome: Progressing  6/19/2025 1154 by Bel Plascencia RN  Outcome: Progressing

## 2025-06-19 NOTE — PLAN OF CARE
Problem: PAIN - ADULT  Goal: Verbalizes/displays adequate comfort level or baseline comfort level  Description: Interventions:  - Encourage patient to monitor pain and request assistance  - Assess pain using appropriate pain scale  - Administer analgesics as ordered based on type and severity of pain and evaluate response  - Implement non-pharmacological measures as appropriate and evaluate response  - Consider cultural and social influences on pain and pain management  - Notify physician/advanced practitioner if interventions unsuccessful or patient reports new pain  - Educate patient/family on pain management process including their role and importance of  reporting pain   - Provide non-pharmacologic/complimentary pain relief interventions  Outcome: Progressing     Problem: INFECTION - ADULT  Goal: Absence or prevention of progression during hospitalization  Description: INTERVENTIONS:  - Assess and monitor for signs and symptoms of infection  - Monitor lab/diagnostic results  - Monitor all insertion sites, i.e. indwelling lines, tubes, and drains  - Monitor endotracheal if appropriate and nasal secretions for changes in amount and color  - [place] appropriate cooling/warming therapies per order  - Administer medications as ordered  - Instruct and encourage patient and family to use good hand hygiene technique  - Identify and instruct in appropriate isolation precautions for identified infection/condition  Outcome: Progressing  Goal: Absence of fever/infection during neutropenic period  Description: INTERVENTIONS:  - Monitor WBC  - Perform strict hand hygiene  - Limit to healthy visitors only  - No plants, dried, fresh or silk flowers with navarrete in patient room  Outcome: Progressing     Problem: SAFETY ADULT  Goal: Patient will remain free of falls  Description: INTERVENTIONS:  - Educate patient/family on patient safety including physical limitations  - Instruct patient to call for assistance with activity   -  Consider consulting OT/PT to assist with strengthening/mobility based on AM PAC & JH-HLM score  - Consult OT/PT to assist with strengthening/mobility   - Keep Call bell within reach  - Keep bed low and locked with side rails adjusted as appropriate  - Keep care items and personal belongings within reach  - Initiate and maintain comfort rounds  - Offer Toileting every 4 Hours, in advance of need  - Obtain necessary fall risk management equipment  - Apply yellow socks and bracelet for high fall risk patients  - Consider moving patient to room near nurses station  Outcome: Progressing  Goal: Maintain or return to baseline ADL function  Description: INTERVENTIONS:  -  Assess patient's ability to carry out ADLs; assess patient's baseline for ADL function and identify physical deficits which impact ability to perform ADLs (bathing, care of mouth/teeth, toileting, grooming, dressing, etc.)  - Assess/evaluate cause of self-care deficits   - Assess range of motion  - Assess patient's mobility; develop plan if impaired  - Assess patient's need for assistive devices and provide as appropriate  - Encourage maximum independence but intervene and supervise when necessary  - Involve family in performance of ADLs  - Assess for home care needs following discharge   - Consider OT consult to assist with ADL evaluation and planning for discharge  - Provide patient education as appropriate  - Monitor functional capacity and physical performance, use of AM PAC & JH-HLM   - Monitor gait, balance and fatigue with ambulation    Outcome: Progressing  Goal: Maintains/Returns to pre admission functional level  Description: INTERVENTIONS:  - Perform AM-PAC 6 Click Basic Mobility/ Daily Activity assessment daily.  - Set and communicate daily mobility goal to care team and patient/family/caregiver.   - Collaborate with rehabilitation services on mobility goals if consulted  - Perform Range of Motion 3 times a day.  - Reposition patient every 2  hours.  - Dangle patient 3 times a day  - Stand patient 3 times a day  - Ambulate patient 3 times a day  - Out of bed to chair 3 times a day   - Out of bed for meals 3 times a day  - Out of bed for toileting  - Record patient progress and toleration of activity level   Outcome: Progressing     Problem: DISCHARGE PLANNING  Goal: Discharge to home or other facility with appropriate resources  Description: INTERVENTIONS:  - Identify barriers to discharge w/patient and caregiver  - Arrange for needed discharge resources and transportation as appropriate  - Identify discharge learning needs (meds, wound care, etc.)  - Arrange for interpretive services to assist at discharge as needed  - Refer to Case Management Department for coordinating discharge planning if the patient needs post-hospital services based on physician/advanced practitioner order or complex needs related to functional status, cognitive ability, or social support system  Outcome: Progressing     Problem: Knowledge Deficit  Goal: Patient/family/caregiver demonstrates understanding of disease process, treatment plan, medications, and discharge instructions  Description: Complete learning assessment and assess knowledge base.  Interventions:  - Provide teaching at level of understanding  - Provide teaching via preferred learning methods  Outcome: Progressing     Problem: NEUROSENSORY - ADULT  Goal: Achieves stable or improved neurological status  Description: INTERVENTIONS  - Monitor and report changes in neurological status  - Monitor vital signs such as temperature, blood pressure, glucose, and any other labs ordered   - Initiate measures to prevent increased intracranial pressure  - Monitor for seizure activity and implement precautions if appropriate      Outcome: Progressing  Goal: Achieves maximal functionality and self care  Description: INTERVENTIONS  - Monitor swallowing and airway patency with patient fatigue and changes in neurological status  -  Encourage and assist patient to increase activity and self care.   - Encourage visually impaired, hearing impaired and aphasic patients to use assistive/communication devices  Outcome: Progressing

## 2025-06-19 NOTE — DISCHARGE SUMMARY
Discharge Summary - Hospitalist   Name: Isidoro Kathleen 49 y.o. 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 S/p aortic valve replacement, and tobacco use presented to ED due to transitory aphasia and forgetfulness. CAT scan showed age-indeterminate hypodense lacunar infarct in the left caudate and left cerebellum. He was started on stroke pathway. INR subtherapeutic. Patient states his Coumadin clinic thinks he was INR between 1.5 and 2. MRI shows no acute infarction and chronic microangiopathic changes and chronic infarctions.    Patient offered short course of Lovenox at home to reach INR therapeutic levels but patient declines  Continue Warfarin 5 mg daily  Current INR 1.4 - subtherapeutic, patient opted to discharge home with follow-up with PCP and coumadin clinic  Consulted neurology  MRI of the brain showed no acute infarcts and echo had EF of 60%   Consulted PT/OT/case management    Other labs:  Lipid panel shows increased triglycerides (177) and decreased HDL (31)  TSH WNL  Increased BUN  H/O: CVA (cerebrovascular accident)  Currently on warfarin which is subtherapeutic  Current INR is 1.4  Patient declined short course of Lovenox at home  Repeat INR as per coumadin and PCP clinic  ACTA2-related familial thoracic aortic aneurysm  Follows with cardiology  Status post aortic dissection repair in 2002 with revision in 2006  Continue aspirin 81 mg daily and Toprol  mg daily  S/P AVR (aortic valve replacement)  On-x Valve 2019  Followed by cardiology  Continue PTA lisinopril 2.5 mg   Continue metoprolol at decreased dose of 50 mg due to bradycardia on admission.  Tobacco use disorder  Still smokes three fourths of a pack per day  Placed on nicotine patch 21 mg  Smoking cessation education given  Chronic thoracic  aortic dissection (HCC)  CT findings: 1. Prosthetic aortic valve and ascending in the graft repair. Type B dissection with patent false lumen and mild aneurysmal dilatation of the descending thoracic aorta.  2. No evidence of acute process in the chest, abdomen or pelvis.     Medical Problems       Resolved Problems  Date Reviewed: 6/19/2025   None       Discharging Physician / Practitioner: Deneen Simons MD  PCP: Hugo Sandoval  Admission Date:   Admission Orders (From admission, onward)       Ordered        06/18/25 1820  INPATIENT ADMISSION  Once                          Discharge Date: 06/19/25    Next Steps for Physician/AP Assuming Care:  Follow up on INR levels  Follow up on smoking cessation education and possible cessation if patient is agreeable.  Follow-up on anxiety management which could be the cause of tobacco use  Follow-up on transient amnesia symptoms     Test Results Pending at Discharge (will require follow up):  none    Medication Changes for Discharge & Rationale:   Decreased metoprolol dose due episodes of bradycardia. To be reassess by PCP and specialist.   See after visit summary for reconciled discharge medications provided to patient and/or family.     Consultations During Hospital Stay:  Neurology   Physical therapy  Occupation therapy    Procedures Performed:   Echocardiogram     Significant Findings / Test Results:   CT Brain: - Age-indeterminate hypodense lacunar infarcts in left caudate and left cerebellum. Recommend MRI brain without contrast for further evaluation.- Mild chronic microangiopathy.  CT Angiography:- Negative CTA head and neck for large vessel occlusion, dissection, aneurysm, or high-grade stenosis. Partially imaged postsurgical repair of aortic dissection - please see same day CTA dissection protocol for further evaluation.  CTA dissection protocol chest/abdomen/pelvis: 1. Prosthetic aortic valve and ascending in the graft repair. Type B dissection with patent  false lumen and mild aneurysmal dilatation of the descending thoracic aorta. 2. No evidence of acute process in the chest, abdomen or pelvis.  MRI brain: 1.  No acute infarction. 2.  Chronic microangiopathic changes and chronic infarctions as above    Incidental Findings:   none     Hospital Course:   Isidoro Kathleen is a 49 y.o. male patient who originally presented to the hospital on 6/18/2025 due to transient global amnesia episodes that have been occurring more lately. The patient last episode lasted a few hours. Work-up was negative for stroke but the patient did have several episodes of bradycardia, thus metoprolol was dosed in half prior to discharge.    Please see above list of diagnoses and related plan for additional information.     Discharge Day Visit / Exam:   * Please refer to separate progress note for these details *    Discussion with Family: Updated  (daughter) at bedside.    Discharge instructions/Information to patient and family:   See after visit summary for information provided to patient and family.      Provisions for Follow-Up Care:  See after visit summary for information related to follow-up care and any pertinent home health orders.      Mobility at time of Discharge:   Basic Mobility Inpatient Raw Score: 24  JH-HLM Goal: 8: Walk 250 feet or more  JH-HLM Achieved: 8: Walk 250 feet ot more  HLM Goal achieved. Continue to encourage appropriate mobility.     Disposition:   Home    Planned Readmission: no    Administrative Statements   Discharge Statement:  I have spent a total time of 40 minutes in caring for this patient on the day of the visit/encounter. >30 minutes of time was spent on: Counseling / Coordination of care, Documenting in the medical record, Reviewing / ordering tests, medicine, procedures  , and Communicating with other healthcare professionals .    **Please Note: This note may have been constructed using a voice recognition system**

## 2025-06-19 NOTE — ASSESSMENT & PLAN NOTE
Still smokes three fourths of a pack per day  Placed on nicotine patch 21 mg  Smoking cessation education given

## 2025-06-19 NOTE — CASE MANAGEMENT
Case Management Discharge Planning Note    Patient name Isidoro Kathleen  Location /410-01 MRN [id number]  : 1975 Date 2025       Current Admission Date: 2025  Current Admission Diagnosis:Acute lacunar infarction (HCC)   Patient Active Problem List    Diagnosis Date Noted    Acute lacunar infarction (HCC) 2025    ACTA2-related familial thoracic aortic aneurysm 2020    S/P AVR (aortic valve replacement) 2019    Esophageal reflux 2011    H/O: CVA (cerebrovascular accident) 2011    Tobacco use disorder 2011    Chronic thoracic aortic dissection (HCC) 10/21/2009      LOS (days): 1  Geometric Mean LOS (GMLOS) (days):   Days to GMLOS:     OBJECTIVE:  Risk of Unplanned Readmission Score: 7.76         Current admission status: Inpatient   Preferred Pharmacy:   [facility] [address] [place] [address] [address] [address] [address] [place] [id number]  Phone: [phone] Fax: [phone]    Primary Care Provider: Hugo Sandovla    Primary Insurance:   Secondary Insurance:     DISCHARGE DETAILS:  Discussed pt in interdisciplinary team meeting. Pt is a probable dc home in 24-48 hours. MRI today. Getting stroke work up. Likely need for meds to beds on dc as pts insurance through his work will not kick in for a month. Cm will continue to follow and assist in dc planning.

## 2025-06-19 NOTE — ASSESSMENT & PLAN NOTE
CT findings: 1. Prosthetic aortic valve and ascending in the graft repair. Type B dissection with patent false lumen and mild aneurysmal dilatation of the descending thoracic aorta.  2. No evidence of acute process in the chest, abdomen or pelvis.

## 2025-06-20 ENCOUNTER — TELEPHONE (OUTPATIENT)
Age: 50
End: 2025-06-20

## 2025-06-20 VITALS
WEIGHT: 171.52 LBS | DIASTOLIC BLOOD PRESSURE: 73 MMHG | HEIGHT: 71 IN | SYSTOLIC BLOOD PRESSURE: 130 MMHG | BODY MASS INDEX: 24.01 KG/M2 | HEART RATE: 52 BPM | RESPIRATION RATE: 18 BRPM | OXYGEN SATURATION: 97 % | TEMPERATURE: 98.6 F

## 2025-06-20 LAB
ANION GAP SERPL CALCULATED.3IONS-SCNC: 6 MMOL/L (ref 4–13)
BASOPHILS # BLD AUTO: 0.02 THOUSANDS/ÂΜL (ref 0–0.1)
BASOPHILS NFR BLD AUTO: 0 % (ref 0–1)
BUN SERPL-MCNC: 21 MG/DL (ref 5–25)
CALCIUM SERPL-MCNC: 8.7 MG/DL (ref 8.4–10.2)
CHLORIDE SERPL-SCNC: 107 MMOL/L (ref 96–108)
CO2 SERPL-SCNC: 26 MMOL/L (ref 21–32)
CREAT SERPL-MCNC: 1.01 MG/DL (ref 0.6–1.3)
EOSINOPHIL # BLD AUTO: 0.09 THOUSAND/ÂΜL (ref 0–0.61)
EOSINOPHIL NFR BLD AUTO: 2 % (ref 0–6)
ERYTHROCYTE [DISTWIDTH] IN BLOOD BY AUTOMATED COUNT: 13 % (ref 11.6–15.1)
GFR SERPL CREATININE-BSD FRML MDRD: 85 ML/MIN/1.73SQ M
GLUCOSE SERPL-MCNC: 78 MG/DL (ref 65–140)
HCT VFR BLD AUTO: 43.4 % (ref 36.5–49.3)
HGB BLD-MCNC: 14.8 G/DL (ref 12–17)
IMM GRANULOCYTES # BLD AUTO: 0.02 THOUSAND/UL (ref 0–0.2)
IMM GRANULOCYTES NFR BLD AUTO: 0 % (ref 0–2)
INR PPP: 1.45 (ref 0.85–1.19)
LYMPHOCYTES # BLD AUTO: 2.08 THOUSANDS/ÂΜL (ref 0.6–4.47)
LYMPHOCYTES NFR BLD AUTO: 40 % (ref 14–44)
MCH RBC QN AUTO: 29.9 PG (ref 26.8–34.3)
MCHC RBC AUTO-ENTMCNC: 34.1 G/DL (ref 31.4–37.4)
MCV RBC AUTO: 88 FL (ref 82–98)
MONOCYTES # BLD AUTO: 0.52 THOUSAND/ÂΜL (ref 0.17–1.22)
MONOCYTES NFR BLD AUTO: 10 % (ref 4–12)
NEUTROPHILS # BLD AUTO: 2.53 THOUSANDS/ÂΜL (ref 1.85–7.62)
NEUTS SEG NFR BLD AUTO: 48 % (ref 43–75)
NRBC BLD AUTO-RTO: 0 /100 WBCS
PLATELET # BLD AUTO: 209 THOUSANDS/UL (ref 149–390)
PMV BLD AUTO: 9.6 FL (ref 8.9–12.7)
POTASSIUM SERPL-SCNC: 4.1 MMOL/L (ref 3.5–5.3)
PROTHROMBIN TIME: 18.1 SECONDS (ref 12.3–15)
RBC # BLD AUTO: 4.95 MILLION/UL (ref 3.88–5.62)
SODIUM SERPL-SCNC: 139 MMOL/L (ref 135–147)
WBC # BLD AUTO: 5.26 THOUSAND/UL (ref 4.31–10.16)

## 2025-06-20 PROCEDURE — 85610 PROTHROMBIN TIME: CPT | Performed by: HOSPITALIST

## 2025-06-20 PROCEDURE — 85025 COMPLETE CBC W/AUTO DIFF WBC: CPT | Performed by: STUDENT IN AN ORGANIZED HEALTH CARE EDUCATION/TRAINING PROGRAM

## 2025-06-20 PROCEDURE — 80048 BASIC METABOLIC PNL TOTAL CA: CPT | Performed by: STUDENT IN AN ORGANIZED HEALTH CARE EDUCATION/TRAINING PROGRAM

## 2025-06-20 RX ORDER — METOPROLOL SUCCINATE 25 MG/1
25 TABLET, EXTENDED RELEASE ORAL EVERY MORNING
Status: DISCONTINUED | OUTPATIENT
Start: 2025-06-20 | End: 2025-06-20

## 2025-06-20 RX ORDER — METOPROLOL SUCCINATE 25 MG/1
25 TABLET, EXTENDED RELEASE ORAL EVERY MORNING
Qty: 30 TABLET | Refills: 0 | Status: SHIPPED | OUTPATIENT
Start: 2025-06-20 | End: 2025-06-20

## 2025-06-20 RX ADMIN — ASPIRIN 81 MG: 81 TABLET, CHEWABLE ORAL at 08:40

## 2025-06-20 RX ADMIN — LISINOPRIL 2.5 MG: 5 TABLET ORAL at 08:40

## 2025-06-20 NOTE — CASE MANAGEMENT
Case Management Discharge Planning Note    Patient name Isidoro Kathleen  Location /410-01 MRN [id number]  : 1975 Date 2025       Current Admission Date: 2025  Current Admission Diagnosis:Acute lacunar infarction (HCC)   Patient Active Problem List    Diagnosis Date Noted    Acute lacunar infarction (HCC) 2025    ACTA2-related familial thoracic aortic aneurysm 2020    S/P AVR (aortic valve replacement) 2019    Esophageal reflux 2011    H/O: CVA (cerebrovascular accident) 2011    Tobacco use disorder 2011    Chronic thoracic aortic dissection (HCC) 10/21/2009      LOS (days): 2  Geometric Mean LOS (GMLOS) (days): 1.9  Days to GMLOS:0.3     OBJECTIVE:  Risk of Unplanned Readmission Score: 7.6         Current admission status: Inpatient   Preferred Pharmacy:   [facility] [address] [place] [id number]  Phone: [phone] Fax: [phone]    Primary Care Provider: Hugo Sandoval    Primary Insurance: [employer] PENDING  Secondary Insurance:     DISCHARGE DETAILS:  Pt is being dc'd home on this date and will follow up with PCP after dc. No need for Meds to Beds on dc.

## 2025-06-20 NOTE — ASSESSMENT & PLAN NOTE
On-x Valve 2019  Followed by cardiology  Continue PTA lisinopril 2.5 mg   D/c metoprolol due to bradycardia

## 2025-06-20 NOTE — TELEPHONE ENCOUNTER
Pt calling back regarding appt. Pt states he will follow up with PCP in his area as well as their recommendation for a neurology team. Pt declining making appt at this time.,     FYI.

## 2025-06-20 NOTE — ASSESSMENT & PLAN NOTE
Follows with cardiology  Status post aortic dissection repair in 2002 with revision in 2006  Continue aspirin 81 mg daily

## 2025-06-20 NOTE — PLAN OF CARE
Problem: PAIN - ADULT  Goal: Verbalizes/displays adequate comfort level or baseline comfort level  Description: Interventions:  - Encourage patient to monitor pain and request assistance  - Assess pain using appropriate pain scale  - Administer analgesics as ordered based on type and severity of pain and evaluate response  - Implement non-pharmacological measures as appropriate and evaluate response  - Consider cultural and social influences on pain and pain management  - Notify physician/advanced practitioner if interventions unsuccessful or patient reports new pain  - Educate patient/family on pain management process including their role and importance of  reporting pain   - Provide non-pharmacologic/complimentary pain relief interventions  6/20/2025 0613 by Remi Mccormick RN  Outcome: Progressing  6/20/2025 0229 by Remi Mccormick RN  Outcome: Progressing     Problem: INFECTION - ADULT  Goal: Absence or prevention of progression during hospitalization  Description: INTERVENTIONS:  - Assess and monitor for signs and symptoms of infection  - Monitor lab/diagnostic results  - Monitor all insertion sites, i.e. indwelling lines, tubes, and drains  - Monitor endotracheal if appropriate and nasal secretions for changes in amount and color  - [place] appropriate cooling/warming therapies per order  - Administer medications as ordered  - Instruct and encourage patient and family to use good hand hygiene technique  - Identify and instruct in appropriate isolation precautions for identified infection/condition  6/20/2025 0613 by Remi Mccormick RN  Outcome: Progressing  6/20/2025 0229 by Remi Mccormick RN  Outcome: Progressing  Goal: Absence of fever/infection during neutropenic period  Description: INTERVENTIONS:  - Monitor WBC  - Perform strict hand hygiene  - Limit to healthy visitors only  - No plants, dried, fresh or silk flowers with navarrete in patient room  6/20/2025 0613 by Remi Mccormick RN  Outcome:  Progressing  6/20/2025 0229 by Remi Mccormick RN  Outcome: Progressing     Problem: SAFETY ADULT  Goal: Patient will remain free of falls  Description: INTERVENTIONS:  - Educate patient/family on patient safety including physical limitations  - Instruct patient to call for assistance with activity   - Consider consulting OT/PT to assist with strengthening/mobility based on AM PAC & JH-HLM score  - Consult OT/PT to assist with strengthening/mobility   - Keep Call bell within reach  - Keep bed low and locked with side rails adjusted as appropriate  - Keep care items and personal belongings within reach  - Initiate and maintain comfort rounds  - Offer Toileting every 4 Hours, in advance of need  - Obtain necessary fall risk management equipment  - Apply yellow socks and bracelet for high fall risk patients  - Consider moving patient to room near nurses station  6/20/2025 0613 by Remi Mccormick RN  Outcome: Progressing  6/20/2025 0229 by Remi Mccormick RN  Outcome: Progressing  Goal: Maintain or return to baseline ADL function  Description: INTERVENTIONS:  -  Assess patient's ability to carry out ADLs; assess patient's baseline for ADL function and identify physical deficits which impact ability to perform ADLs (bathing, care of mouth/teeth, toileting, grooming, dressing, etc.)  - Assess/evaluate cause of self-care deficits   - Assess range of motion  - Assess patient's mobility; develop plan if impaired  - Assess patient's need for assistive devices and provide as appropriate  - Encourage maximum independence but intervene and supervise when necessary  - Involve family in performance of ADLs  - Assess for home care needs following discharge   - Consider OT consult to assist with ADL evaluation and planning for discharge  - Provide patient education as appropriate  - Monitor functional capacity and physical performance, use of AM PAC & JH-HLM   - Monitor gait, balance and fatigue with ambulation    6/20/2025 0613 by  Remi Mccormick RN  Outcome: Progressing  6/20/2025 0229 by Remi Mccormick RN  Outcome: Progressing  Goal: Maintains/Returns to pre admission functional level  Description: INTERVENTIONS:  - Perform AM-PAC 6 Click Basic Mobility/ Daily Activity assessment daily.  - Set and communicate daily mobility goal to care team and patient/family/caregiver.   - Collaborate with rehabilitation services on mobility goals if consulted  - Perform Range of Motion 3 times a day.  - Reposition patient every 2 hours.  - Dangle patient 3 times a day  - Stand patient 3 times a day  - Ambulate patient 3 times a day  - Out of bed to chair 3 times a day   - Out of bed for meals 3 times a day  - Out of bed for toileting  - Record patient progress and toleration of activity level   6/20/2025 0613 by Remi Mccormick RN  Outcome: Progressing  6/20/2025 0229 by Remi Mccormick RN  Outcome: Progressing     Problem: DISCHARGE PLANNING  Goal: Discharge to home or other facility with appropriate resources  Description: INTERVENTIONS:  - Identify barriers to discharge w/patient and caregiver  - Arrange for needed discharge resources and transportation as appropriate  - Identify discharge learning needs (meds, wound care, etc.)  - Arrange for interpretive services to assist at discharge as needed  - Refer to Case Management Department for coordinating discharge planning if the patient needs post-hospital services based on physician/advanced practitioner order or complex needs related to functional status, cognitive ability, or social support system  6/20/2025 0613 by Remi Mccormick RN  Outcome: Progressing  6/20/2025 0229 by Remi Mccormick RN  Outcome: Progressing     Problem: Knowledge Deficit  Goal: Patient/family/caregiver demonstrates understanding of disease process, treatment plan, medications, and discharge instructions  Description: Complete learning assessment and assess knowledge base.  Interventions:  - Provide teaching at level of  understanding  - Provide teaching via preferred learning methods  6/20/2025 0613 by Remi Mccormick RN  Outcome: Progressing  6/20/2025 0229 by Remi Mccormick RN  Outcome: Progressing     Problem: NEUROSENSORY - ADULT  Goal: Achieves stable or improved neurological status  Description: INTERVENTIONS  - Monitor and report changes in neurological status  - Monitor vital signs such as temperature, blood pressure, glucose, and any other labs ordered   - Initiate measures to prevent increased intracranial pressure  - Monitor for seizure activity and implement precautions if appropriate      6/20/2025 0613 by Remi Mccormick RN  Outcome: Progressing  6/20/2025 0229 by Remi Mccormick RN  Outcome: Progressing  Goal: Achieves maximal functionality and self care  Description: INTERVENTIONS  - Monitor swallowing and airway patency with patient fatigue and changes in neurological status  - Encourage and assist patient to increase activity and self care.   - Encourage visually impaired, hearing impaired and aphasic patients to use assistive/communication devices  6/20/2025 0613 by Remi Mccormick RN  Outcome: Progressing  6/20/2025 0229 by Remi Mccormick RN  Outcome: Progressing

## 2025-06-20 NOTE — ASSESSMENT & PLAN NOTE
Currently on warfarin which is subtherapeutic  Current INR is 1.4  Patient declined short course of Lovenox at home  Repeat INR as per coumadin and PCP clinic   5

## 2025-06-20 NOTE — TELEPHONE ENCOUNTER
1ST ATTEMPT,     Called pt no answer, LMOM.    Thank you,   Medina    [facility] - [facility] - Acute lacunar infarction     D/C - HOME - 06/20/2025    This patient will need follow-up in in 6 weeks with general neurology team for Transient global amnesia in 60 minute appointment. They will not require outpatient neurological testing.

## 2025-06-20 NOTE — ASSESSMENT & PLAN NOTE
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 S/p aortic valve replacement, and tobacco use presented to ED due to transitory aphasia and forgetfulness. CAT scan showed age-indeterminate hypodense lacunar infarct in the left caudate and left cerebellum. He was started on stroke pathway. INR subtherapeutic. Patient states his Coumadin clinic thinks he was INR between 1.5 and 2. MRI shows no acute infarction and chronic microangiopathic changes and chronic infarctions.    Patient offered short course of Lovenox at home to reach INR therapeutic levels but patient declines  Continue Warfarin 5 mg daily  Current INR 1.4 - subtherapeutic, patient opted to discharge home with follow-up with PCP and coumadin clinic  Consulted neurology  MRI of the brain showed no acute infarcts and echo had EF of 60%   Consulted PT/OT/case management    Other labs:  Lipid panel shows increased triglycerides (177) and decreased HDL (31)  TSH WNL  Increased BUN

## 2025-06-20 NOTE — DISCHARGE SUMMARY
Discharge Summary - Hospitalist   Name: Isidoro Kathleen 49 y.o. 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 S/p aortic valve replacement, and tobacco use presented to ED due to transitory aphasia and forgetfulness. CAT scan showed age-indeterminate hypodense lacunar infarct in the left caudate and left cerebellum. He was started on stroke pathway. INR subtherapeutic. Patient states his Coumadin clinic thinks he was INR between 1.5 and 2. MRI shows no acute infarction and chronic microangiopathic changes and chronic infarctions.    Patient offered short course of Lovenox at home to reach INR therapeutic levels but patient declines  Continue Warfarin 5 mg daily  Current INR 1.4 - subtherapeutic, patient opted to discharge home with follow-up with PCP and coumadin clinic  Consulted neurology  MRI of the brain showed no acute infarcts and echo had EF of 60%   Consulted PT/OT/case management    Other labs:  Lipid panel shows increased triglycerides (177) and decreased HDL (31)  TSH WNL  Increased BUN  H/O: CVA (cerebrovascular accident)  Currently on warfarin which is subtherapeutic  Current INR is 1.4  Patient declined short course of Lovenox at home  Repeat INR as per coumadin and PCP clinic  ACTA2-related familial thoracic aortic aneurysm  Follows with cardiology  Status post aortic dissection repair in 2002 with revision in 2006  Continue aspirin 81 mg daily  S/P AVR (aortic valve replacement)  On-x Valve 2019  Followed by cardiology  Continue PTA lisinopril 2.5 mg   D/c metoprolol due to bradycardia  Tobacco use disorder  Still smokes three fourths of a pack per day  Placed on nicotine patch 21 mg  Smoking cessation education given  Chronic thoracic aortic dissection (HCC)  CT findings: 1. Prosthetic aortic valve and  ascending in the graft repair. Type B dissection with patent false lumen and mild aneurysmal dilatation of the descending thoracic aorta.  2. No evidence of acute process in the chest, abdomen or pelvis.     Medical Problems       Resolved Problems  Date Reviewed: 6/19/2025   None       Discharging Physician / Practitioner: Deneen Simons MD  PCP: Hugo Sandoval  Admission Date:   Admission Orders (From admission, onward)       Ordered        06/18/25 1820  INPATIENT ADMISSION  Once                          Discharge Date: 06/20/25    Next Steps for Physician/AP Assuming Care:  Follow up on INR levels  Follow up on smoking cessation education and possible cessation if patient is agreeable.  Follow-up on anxiety management which could be the cause of tobacco use  Follow-up on transient amnesia symptoms     Test Results Pending at Discharge (will require follow up):  none    Medication Changes for Discharge & Rationale:   D/c metoprolol dose due episodes of bradycardia. To be reassess by PCP and specialist.   See after visit summary for reconciled discharge medications provided to patient and/or family.     Consultations During Hospital Stay:  Neurology   Physical therapy  Occupation therapy    Procedures Performed:   Echocardiogram     Significant Findings / Test Results:   CT Brain: - Age-indeterminate hypodense lacunar infarcts in left caudate and left cerebellum. Recommend MRI brain without contrast for further evaluation.- Mild chronic microangiopathy.  CT Angiography:- Negative CTA head and neck for large vessel occlusion, dissection, aneurysm, or high-grade stenosis. Partially imaged postsurgical repair of aortic dissection - please see same day CTA dissection protocol for further evaluation.  CTA dissection protocol chest/abdomen/pelvis: 1. Prosthetic aortic valve and ascending in the graft repair. Type B dissection with patent false lumen and mild aneurysmal dilatation of the descending thoracic  aorta. 2. No evidence of acute process in the chest, abdomen or pelvis.  MRI brain: 1.  No acute infarction. 2.  Chronic microangiopathic changes and chronic infarctions as above    Incidental Findings:   none     Hospital Course:   Isidoro Kathleen is a 49 y.o. male patient who originally presented to the hospital on 6/18/2025 due to transient global amnesia episodes that have been occurring more lately. The patient last episode lasted a few hours. Work-up was negative for stroke but the patient did have several episodes of bradycardia, thus metoprolol was stopped at discharge.    Please see above list of diagnoses and related plan for additional information.     Discharge Day Visit / Exam:   * Please refer to separate progress note for these details *    Discussion with Family: Updated  (daughter) at bedside.    Discharge instructions/Information to patient and family:   See after visit summary for information provided to patient and family.      Provisions for Follow-Up Care:  See after visit summary for information related to follow-up care and any pertinent home health orders.      Mobility at time of Discharge:   Basic Mobility Inpatient Raw Score: 24  JH-HLM Goal: 8: Walk 250 feet or more  JH-HLM Achieved: 7: Walk 25 feet or more  HLM Goal achieved. Continue to encourage appropriate mobility.     Disposition:   Home    Planned Readmission: no    Administrative Statements   Discharge Statement:  I have spent a total time of 40 minutes in caring for this patient on the day of the visit/encounter. >30 minutes of time was spent on: Counseling / Coordination of care, Documenting in the medical record, Reviewing / ordering tests, medicine, procedures  , and Communicating with other healthcare professionals .    **Please Note: This note may have been constructed using a voice recognition system**